# Patient Record
Sex: MALE | Race: WHITE | NOT HISPANIC OR LATINO | Employment: FULL TIME | URBAN - METROPOLITAN AREA
[De-identification: names, ages, dates, MRNs, and addresses within clinical notes are randomized per-mention and may not be internally consistent; named-entity substitution may affect disease eponyms.]

---
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
## 2021-09-18 ENCOUNTER — IMMUNIZATION (OUTPATIENT)
Dept: PRIMARY CARE CLINIC | Facility: CLINIC | Age: 21
End: 2021-09-18

## 2021-09-18 DIAGNOSIS — Z23 NEED FOR VACCINATION: Primary | ICD-10-CM

## 2021-10-04 ENCOUNTER — HOSPITAL ENCOUNTER (EMERGENCY)
Facility: HOSPITAL | Age: 21
Discharge: HOME | End: 2021-10-04
Attending: EMERGENCY MEDICINE
Payer: COMMERCIAL

## 2021-10-04 VITALS
WEIGHT: 175 LBS | HEIGHT: 77 IN | HEART RATE: 75 BPM | DIASTOLIC BLOOD PRESSURE: 63 MMHG | BODY MASS INDEX: 20.66 KG/M2 | RESPIRATION RATE: 18 BRPM | SYSTOLIC BLOOD PRESSURE: 114 MMHG | TEMPERATURE: 99 F | OXYGEN SATURATION: 97 %

## 2021-10-04 DIAGNOSIS — R55 VASOVAGAL ATTACK: Primary | ICD-10-CM

## 2021-10-04 DIAGNOSIS — R10.84 COLICKY ABDOMINAL PAIN: ICD-10-CM

## 2021-10-04 PROCEDURE — 99282 EMERGENCY DEPT VISIT SF MDM: CPT

## 2021-10-04 RX ORDER — DICYCLOMINE HYDROCHLORIDE 10 MG/1
10 CAPSULE ORAL
COMMUNITY
End: 2022-06-02

## 2021-10-04 RX ORDER — SUCRALFATE 1 G/1
1 TABLET ORAL 4 TIMES DAILY
COMMUNITY
End: 2023-01-30

## 2021-10-04 RX ORDER — PANTOPRAZOLE SODIUM 40 MG/1
40 TABLET, DELAYED RELEASE ORAL DAILY
COMMUNITY
End: 2022-03-29

## 2022-02-09 ENCOUNTER — TELEPHONE (OUTPATIENT)
Dept: GASTROENTEROLOGY | Facility: CLINIC | Age: 22
End: 2022-02-09
Payer: COMMERCIAL

## 2022-02-09 NOTE — TELEPHONE ENCOUNTER
Contact and spoke with patient. Reviewed pink sheet with patient. Inform patient to fax any records that he may have at home to [phone].     Patient verbalized understanding

## 2022-02-14 ENCOUNTER — HOSPITAL ENCOUNTER (OUTPATIENT)
Dept: RADIOLOGY | Facility: HOSPITAL | Age: 22
Discharge: HOME | End: 2022-02-14
Attending: NURSE PRACTITIONER
Payer: COMMERCIAL

## 2022-02-14 DIAGNOSIS — R13.10 DYSPHAGIA: ICD-10-CM

## 2022-02-14 DIAGNOSIS — K21.9 GERD (GASTROESOPHAGEAL REFLUX DISEASE): ICD-10-CM

## 2022-02-14 PROCEDURE — 25500020 PHARM REV CODE 255

## 2022-02-14 PROCEDURE — 74220 FL ESOPHAGRAM WITH BARIUM TABLET: ICD-10-PCS | Mod: 26,,, | Performed by: RADIOLOGY

## 2022-02-14 PROCEDURE — 74220 X-RAY XM ESOPHAGUS 1CNTRST: CPT | Mod: 26,,, | Performed by: RADIOLOGY

## 2022-02-14 PROCEDURE — 74220 X-RAY XM ESOPHAGUS 1CNTRST: CPT | Mod: TC

## 2022-02-14 PROCEDURE — A9698 NON-RAD CONTRAST MATERIALNOC: HCPCS

## 2022-02-14 RX ADMIN — BARIUM SULFATE 250 ML: 0.6 SUSPENSION ORAL at 10:02

## 2022-03-04 ENCOUNTER — TELEPHONE (OUTPATIENT)
Dept: GASTROENTEROLOGY | Facility: CLINIC | Age: 22
End: 2022-03-04
Payer: COMMERCIAL

## 2022-03-04 NOTE — TELEPHONE ENCOUNTER
Called pt and scheduled NP appointment for 3/29/22 at 10am    Discussed with pt that Dr. Solis is a consultant that does not accept patients as a primary GI provider.  Discussed that she will send them back to their primary GI provider once their symptoms improved or the plan of care is established.     Pt was told the logistics of the appointment (arrival time, length of visit, total time spent at facility).     Pt was also told that Dr. Solis will review their previous workup but may order additional test and perform her own procedures.    KRYSTIAN faxed to [facility] requesting recent esophagram done last month.  Received email confirmation fax was successful at 3:13

## 2022-03-10 ENCOUNTER — TELEPHONE (OUTPATIENT)
Dept: GASTROENTEROLOGY | Facility: CLINIC | Age: 22
End: 2022-03-10
Payer: COMMERCIAL

## 2022-03-10 NOTE — TELEPHONE ENCOUNTER
Called pt, no ans  [facility] re: if patient had any MBSS study, and if so where.  Asked pt to call or message me.'  Ph no provided

## 2022-03-15 ENCOUNTER — TELEPHONE (OUTPATIENT)
Dept: GASTROENTEROLOGY | Facility: CLINIC | Age: 22
End: 2022-03-15
Payer: COMMERCIAL

## 2022-03-28 RX ORDER — CITALOPRAM 10 MG/1
10 TABLET ORAL DAILY
COMMUNITY
Start: 2022-02-12

## 2022-03-28 RX ORDER — HYDROGEN PEROXIDE 3 %
20 SOLUTION, NON-ORAL MISCELLANEOUS 2 TIMES DAILY
COMMUNITY
Start: 2022-03-03 | End: 2023-01-31

## 2022-03-29 ENCOUNTER — OFFICE VISIT (OUTPATIENT)
Dept: GASTROENTEROLOGY | Facility: CLINIC | Age: 22
End: 2022-03-29
Payer: COMMERCIAL

## 2022-03-29 ENCOUNTER — OFFICE VISIT (OUTPATIENT)
Dept: CARDIOLOGY | Facility: CLINIC | Age: 22
End: 2022-03-29
Payer: COMMERCIAL

## 2022-03-29 ENCOUNTER — TELEPHONE (OUTPATIENT)
Dept: GASTROENTEROLOGY | Facility: CLINIC | Age: 22
End: 2022-03-29

## 2022-03-29 ENCOUNTER — TELEPHONE (OUTPATIENT)
Dept: GASTROENTEROLOGY | Facility: CLINIC | Age: 22
End: 2022-03-29
Payer: COMMERCIAL

## 2022-03-29 VITALS
DIASTOLIC BLOOD PRESSURE: 85 MMHG | HEART RATE: 70 BPM | BODY MASS INDEX: 20.66 KG/M2 | OXYGEN SATURATION: 100 % | HEIGHT: 77 IN | WEIGHT: 175 LBS | SYSTOLIC BLOOD PRESSURE: 130 MMHG

## 2022-03-29 VITALS
BODY MASS INDEX: 21.24 KG/M2 | WEIGHT: 179.88 LBS | HEIGHT: 77 IN | SYSTOLIC BLOOD PRESSURE: 130 MMHG | DIASTOLIC BLOOD PRESSURE: 77 MMHG | HEART RATE: 92 BPM

## 2022-03-29 DIAGNOSIS — K22.2 ESOPHAGEAL STRICTURE: ICD-10-CM

## 2022-03-29 DIAGNOSIS — K21.9 GASTROESOPHAGEAL REFLUX DISEASE, UNSPECIFIED WHETHER ESOPHAGITIS PRESENT: ICD-10-CM

## 2022-03-29 DIAGNOSIS — R55 PRE-SYNCOPE: ICD-10-CM

## 2022-03-29 DIAGNOSIS — R07.89 NON-CARDIAC CHEST PAIN: ICD-10-CM

## 2022-03-29 DIAGNOSIS — F41.9 ANXIETY: ICD-10-CM

## 2022-03-29 DIAGNOSIS — R55 VASOVAGAL NEAR SYNCOPE: ICD-10-CM

## 2022-03-29 DIAGNOSIS — R13.10 DYSPHAGIA, UNSPECIFIED TYPE: Primary | ICD-10-CM

## 2022-03-29 PROCEDURE — 3079F DIAST BP 80-89 MM HG: CPT | Mod: CPTII,S$GLB,, | Performed by: INTERNAL MEDICINE

## 2022-03-29 PROCEDURE — 93005 EKG 12-LEAD: ICD-10-PCS | Mod: S$GLB,,, | Performed by: STUDENT IN AN ORGANIZED HEALTH CARE EDUCATION/TRAINING PROGRAM

## 2022-03-29 PROCEDURE — 1160F PR REVIEW ALL MEDS BY PRESCRIBER/CLIN PHARMACIST DOCUMENTED: ICD-10-PCS | Mod: CPTII,S$GLB,, | Performed by: INTERNAL MEDICINE

## 2022-03-29 PROCEDURE — 93000 ELECTROCARDIOGRAM COMPLETE: CPT | Mod: S$GLB,,, | Performed by: INTERNAL MEDICINE

## 2022-03-29 PROCEDURE — 3075F SYST BP GE 130 - 139MM HG: CPT | Mod: CPTII,S$GLB,, | Performed by: INTERNAL MEDICINE

## 2022-03-29 PROCEDURE — 93005 ELECTROCARDIOGRAM TRACING: CPT | Mod: S$GLB,,, | Performed by: STUDENT IN AN ORGANIZED HEALTH CARE EDUCATION/TRAINING PROGRAM

## 2022-03-29 PROCEDURE — 3008F BODY MASS INDEX DOCD: CPT | Mod: CPTII,S$GLB,, | Performed by: INTERNAL MEDICINE

## 2022-03-29 PROCEDURE — 1160F RVW MEDS BY RX/DR IN RCRD: CPT | Mod: CPTII,S$GLB,, | Performed by: INTERNAL MEDICINE

## 2022-03-29 PROCEDURE — 99999 PR PBB SHADOW E&M-EST. PATIENT-LVL IV: CPT | Mod: PBBFAC,,, | Performed by: STUDENT IN AN ORGANIZED HEALTH CARE EDUCATION/TRAINING PROGRAM

## 2022-03-29 PROCEDURE — 99999 PR PBB SHADOW E&M-EST. PATIENT-LVL V: CPT | Mod: PBBFAC,,, | Performed by: INTERNAL MEDICINE

## 2022-03-29 PROCEDURE — 99205 OFFICE O/P NEW HI 60 MIN: CPT | Mod: S$GLB,,, | Performed by: INTERNAL MEDICINE

## 2022-03-29 PROCEDURE — 99205 PR OFFICE/OUTPT VISIT, NEW, LEVL V, 60-74 MIN: ICD-10-PCS | Mod: S$GLB,,, | Performed by: INTERNAL MEDICINE

## 2022-03-29 PROCEDURE — 1159F PR MEDICATION LIST DOCUMENTED IN MEDICAL RECORD: ICD-10-PCS | Mod: CPTII,S$GLB,, | Performed by: INTERNAL MEDICINE

## 2022-03-29 PROCEDURE — 99999 PR PBB SHADOW E&M-EST. PATIENT-LVL IV: ICD-10-PCS | Mod: PBBFAC,,, | Performed by: STUDENT IN AN ORGANIZED HEALTH CARE EDUCATION/TRAINING PROGRAM

## 2022-03-29 PROCEDURE — 99999 PR PBB SHADOW E&M-EST. PATIENT-LVL V: ICD-10-PCS | Mod: PBBFAC,,, | Performed by: INTERNAL MEDICINE

## 2022-03-29 PROCEDURE — 99204 PR OFFICE/OUTPT VISIT, NEW, LEVL IV, 45-59 MIN: ICD-10-PCS | Mod: 25,S$GLB,, | Performed by: STUDENT IN AN ORGANIZED HEALTH CARE EDUCATION/TRAINING PROGRAM

## 2022-03-29 PROCEDURE — 93000 EKG 12-LEAD: ICD-10-PCS | Mod: S$GLB,,, | Performed by: INTERNAL MEDICINE

## 2022-03-29 PROCEDURE — 99204 OFFICE O/P NEW MOD 45 MIN: CPT | Mod: 25,S$GLB,, | Performed by: STUDENT IN AN ORGANIZED HEALTH CARE EDUCATION/TRAINING PROGRAM

## 2022-03-29 PROCEDURE — 3008F PR BODY MASS INDEX (BMI) DOCUMENTED: ICD-10-PCS | Mod: CPTII,S$GLB,, | Performed by: INTERNAL MEDICINE

## 2022-03-29 PROCEDURE — 3079F PR MOST RECENT DIASTOLIC BLOOD PRESSURE 80-89 MM HG: ICD-10-PCS | Mod: CPTII,S$GLB,, | Performed by: INTERNAL MEDICINE

## 2022-03-29 PROCEDURE — 1159F MED LIST DOCD IN RCRD: CPT | Mod: CPTII,S$GLB,, | Performed by: INTERNAL MEDICINE

## 2022-03-29 PROCEDURE — 3075F PR MOST RECENT SYSTOLIC BLOOD PRESS GE 130-139MM HG: ICD-10-PCS | Mod: CPTII,S$GLB,, | Performed by: INTERNAL MEDICINE

## 2022-03-29 NOTE — TELEPHONE ENCOUNTER
AVS reviewed with patient and mother. Copy given  Scheduled cardiology apt for today at 2:20pm    Asked pt to call Kacie when he gets his work schedule on or about 4/7/22 and leave her a message as to days off of work. 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

## 2022-03-29 NOTE — ASSESSMENT & PLAN NOTE
-suspect this is 2/2 from anxiety/autonomic dysfunction  -ECG in clinic with normal sinus rhythm  -pending TTE  -if TTE unrevealing and symptoms persist, will refer to Dr. Marcelino in  for autonomic dysfunction

## 2022-03-29 NOTE — TELEPHONE ENCOUNTER
[facility] PROCEDURE ORDERS    CLEARANCE FOR PROCEDURES:  [] Not needed   [x] Cardiology  For EGD not manometry   [] Pulmonary  [] PCP    PROCEDURES  [] EGD   [] Colonoscopy   [x] EGD with EndoFlip   [x] Esophageal manometry with impedance - dysphagia   [x] Esophageal manometry with impedance - rumination  [] Esophageal manometry with impedance - belching   [] EGD with BRAVO 48 hours   [] EGD with BRAVO 96 hours   [] pH Impedance 24 hours   [] Anorectal manometry   [] Rectal Ultrasound     Does manometry need to be placed endoscopically:   [] Yes    FLOOR:  [x] 4th Floor   [] 2nd Floor    Reason for 2nd Floor:   [] Gastroparesis   [] End stage achalasia  [] Pre lung transplant   [] Pre hear transplant   [] Pulmonary HTN (PAP>50 or on meds)   [] Severe pulmonary Disease   [] Complex medical problems   [] BMI>50  [] History of anesthesia issues  [] Other:    PREP  [x] Standard Prep  [] Severe Constipation Prep (Low residue diet 7 days.  1.5 prep the day prior, 0.5 prep the day of procedure)  [] Full liquid diet 5 days   [] Full liquid diet 3 days   [] Full liquid diet 2 days   [] Full liquid diet 1 day   [] Other:     MEDICATIONS    pH Studies  [] ON PPI/H2 Blocker   [] OFF PPI/H2 Blocker     Metal allergy:   []  Yes    Pacemaker/defibrillator:  [] Yes    Motility Studies (esophageal manometry/anorectal manometry)  Hold narcotics x 1 days if able   Hold TCA x 1 days if able  Propofol/lidocaine only during sedation.  Discuss with Dr. Solis if additional sedation needed.   Hold baclofen for thee days (at least one day) if able   Hold muscle relaxants x 1 day if able     Anticoagulation/antiplt agents:   []  Yes    ORDER OF TESTING:  Day 1: Manometry ASAP  Day 2: EGD/Flip pending cardiac clearance     [] No special requirements - pt lives locally     SCHEDULING PRIORITY  [] Urgent  (<7 days)  [] Priority (<30 days)  [x] Routine 1 (schedule ASAP)  [] Routine 2 (next available, < 3 months)   [] Routine 3 (ok if > 3  months)       PHYSICIAN  []  Ok with Dr. Gutierrez   []  Ok with any GI MD

## 2022-03-29 NOTE — PROGRESS NOTES
" Patient ID:  Sincere Way is a 21 y.o. y.o. male who presents for evaluation Establish Care and Follow-up      Sincere Way is a 20 yo M with anxiety and GERD/esophageal stricture who presents for pre-syncope episodes. He states these episodes have happened several times, and seem to be related to worsening GERD symptoms or when he is at work. He denies preceding palpitations, and states that he begins to feel hot, sweaty and has to lie down. This has happened at work and his coworkers have taken his BP which he reports as SBP in the 80s. If he lies down, and relaxes, he states his symptoms improve. He has no significant prior history of CHF/arrhythmia and states no one in his family has suffered from connective tissue disorders or similar complaints. He recently saw his GI doctor who believes his symptoms sounded cardiac in nature, and he was then referred to [facility] cardiology for evaluation. No other acute events.       Review of Systems   Cardiovascular: Negative for chest pain and palpitations.   Neurological: Positive for dizziness and weakness.   All other systems reviewed and are negative.       Objective:     /77 (BP Location: Left arm, Patient Position: Sitting, BP Method: Medium (Automatic))   Pulse 92   Ht 6' 5" (1.956 m)   Wt 81.6 kg (179 lb 14.3 oz)   BMI 21.33 kg/m²     Physical Exam  Vitals and nursing note reviewed.   Constitutional:       Appearance: Normal appearance.   HENT:      Head: Normocephalic and atraumatic.      Right Ear: External ear normal.      Left Ear: External ear normal.      Nose: Nose normal.      Mouth/Throat:      Mouth: Mucous membranes are moist.   Eyes:      Pupils: Pupils are equal, round, and reactive to light.   Cardiovascular:      Rate and Rhythm: Normal rate and regular rhythm.      Pulses: Normal pulses.   Pulmonary:      Effort: Pulmonary effort is normal.   Abdominal:      General: Abdomen is flat.   Musculoskeletal:         General: Normal range of " motion.      Cervical back: Normal range of motion.   Skin:     General: Skin is warm.      Capillary Refill: Capillary refill takes less than 2 seconds.   Neurological:      General: No focal deficit present.      Mental Status: He is alert and oriented to person, place, and time.         Labs:     Lab Results   Component Value Date     02/26/2014    K 4.4 02/26/2014    CL 99 02/26/2014    CO2 27 02/26/2014    BUN 12 02/26/2014    CREATININE 0.9 02/26/2014    ANIONGAP 12 02/26/2014     No results found for: HGBA1C  No results found for: BNP, BNPTRIAGEBLO    Lab Results   Component Value Date    WBC 5.11 02/26/2014    HGB 14.7 02/26/2014    HCT 43.0 02/26/2014     (L) 02/26/2014    GRAN 3.4 02/26/2014    GRAN 66.2 (H) 02/26/2014     No results found for: CHOL, HDL, LDLCALC, TRIG    Meds:     Current Outpatient Medications:     citalopram (CELEXA) 10 MG tablet, Take 10 mg by mouth once daily., Disp: , Rfl:     dicyclomine (BENTYL) 10 MG capsule, Take 10 mg by mouth 4 (four) times daily before meals and nightly., Disp: , Rfl:     esomeprazole (NEXIUM) 20 MG capsule, Take 20 mg by mouth 2 (two) times daily., Disp: , Rfl:     sucralfate (CARAFATE) 1 gram tablet, Take 1 g by mouth 4 (four) times daily., Disp: , Rfl:       Assessment & Plan:     Vasovagal near syncope  -suspect this is 2/2 from anxiety/autonomic dysfunction  -ECG in clinic with normal sinus rhythm  -pending TTE  -if TTE unrevealing and symptoms persist, will refer to Dr. Marcelino in  for autonomic dysfunction     Esophageal stricture  -pending GI workup

## 2022-03-29 NOTE — PATIENT INSTRUCTIONS
-See cardiology to evaluate episodes of dizziness and low blood pressure and to provide cardiac clearance   -Start peppermint three to four times per day (altoids, peppermint tea, peppermint oil (four drops in half glass of water)   -Complete esophageal manometry   During esophageal manometry, a thin, flexible tube (catheter) that contains pressure sensors is passed through your nose, down your esophagus and into your stomach. Esophageal manometry can be helpful in diagnosing certain disorders that can affect your esophagus.  Esophageal manometry provides information about the movement of food through the esophagus into the stomach. The test measures how well the muscles at the top and bottom of your esophagus (sphincter muscles) open and close, as well as the pressure, speed and pattern of the wave of esophageal muscle contractions that moves food along.  -Complete EGD with EndoFlip  EGD is an endoscopic procedure that allows your doctor to examine your esophagus, stomach and duodenum (part of your small intestine). EGD is an outpatient procedure, meaning you can go home that same day. It takes approximately 30 to 60 minutes to perform.    EndoFLIP is a technology that simultaneously measures the area across the inside of a gastrointestinal organ (for example, the esophagus) and the pressure inside that organ. The ratio of the two measurements is called distensibility (stiffness).  How is EndoFLIP performed?  During upper endoscopy, while you are sedated, your doctor will place a catheter through your mouth into your esophagus. Fluid is passed through the catheter to inflate a cylinder-shaped balloon that contains specialized sensors. From the patients perspective, undergoing a diagnostic EndoFLIP is exactly similar to the experience of undergoing upper endoscopy. The procedure can add 10 to 15 minutes to the total duration of the endoscopy.  Who needs EndoFLIP?  EndoFLIP can help find the cause of difficulty  swallowing; for example, in patients with suspected achalasia, or in patients who have difficulty swallowing after surgery for gastroesophageal reflux disease (GERD). EndoFLIP can also be used to perform a therapeutic dilation, without the need for X-rays.  What are the potential complications of EndoFLIP?  When EndoFLIP is used for diagnostic purposes, in theory, there is a risk of bleeding or perforation (putting a hole in the wall of the gut), but the catheter is connected to a computer that drives the inflation and deflation of the balloon, thus limiting the size and pressure of the inflation. There are no known reports of serious complications when EndoFLIP has been used for diagnostic purposes.

## 2022-03-29 NOTE — PROGRESS NOTES
[facility] Consultation Note    Reason for Consult:    Chief Complaint   Patient presents with    Heartburn    Dysphagia         PCP:   Amanda Richardson   [address] / [facility] [id number]    Referring MD:  Cachorro Swenson Md  [address]  [address]  [place] [address]      HPI:  Sincere Way is a 21 y.o. male referred to motility clinic for second opinion regarding the following problems:        Dr. Alford 01/12/2020: 21-year-old man here with complaints of dysphagia to solids more so than liquids.  Upper GI was normal and EGD done 09/2021 demonstrated a lower esophageal stricture which was dilated with a balloon to 54 Bengali.  This did nothing for his symptoms.  He continues to have bolus impactions and frequently has to vomit his food up.  He also has reflux only partially relieved by Nexium 20 mg q.h.s..  He was having abdominal pain which has been relieved by citalopram.  Plan consult [facility] for esophageal motility study.  Increase Nexium to 20 mg b.i.d..    GERD.      Retrosternal pyrosis: occasional    Regurgitation: none  Onset: after COVID 2020  Improve with:   PPI     Upright symptoms: yes  Nocturnal symptoms: no, yes in the past     Caffeine intake: few cups per day     Sleeps with head of the bed elevated. w pillows/on L side  Avoids eating prior to bedtime.  yes    MEDs:  Nexium 20mg daily   Tums prn     Regurgitates white foamy liquids: daily     Rumination Syndrome  Symptoms occur within 300 minutes of finishing a meal: yes  Regurgitate lacks sour or bitter taste: yes  Regurgitate described as tasting similar to the food recently ingested: yes  Little or no improvement after GERD or nausea directed treatment: yes  No symptoms during sleep or fasting: yes  Weight loss (40% of patients): yes  Related to reflux: yes  Related to belching: no      Dysphagia.    Problems with solids: w every meal   Problems with liquids: occasional   Choking  while eating: occasional    Coughing while eating: occasional  Location: epigastric   Onset of symptoms: 2020, progressively worse   Worse w cold     History of food impactions requiring ED visit: no  History of allergies/eczema. no  Short duration of dysphagia (<1 year): no  Serious weight loss (>6.8 kg): yes  Age over 55 years: no    No meds  EGD w dilation 54Fr without improvement1          Chest pain with and without eating.   Burning.  Associated w sharp pain in neck pain   Onset: 2020   Triggers (cold fluids, caffeine, smoking, ETOH): none    Eckardt Symptom Score  Dysphagia: 3  Regurgitation: 2  Retrosternal pain: 1  Weight Loss: 0  Total: 6    Abd pain.  IBS-D  Improved w celexa     Weight loss   15 lb initially, than gained some  back  Now 180lb    Vasovagal response triggered by GERD episodes, sometimes triggered by other symptoms including stress  BP 80/50  4 episodes recently   Had mild version of these symptoms as a child   Started 2021  Seen in ED, told that had a vagal response      Anxiety (abd pain w stress)  Not aware of anxiety   On celexa per PCP   Never seen a counselor     Insomnia. Improved w PPI     Denies nausea, early satiety, abdominal pain, bloating, diarrhea, constipation, BRBPR, melena      Total visit time was 62  minutes, more than 50% of which was spent in face-to-face counseling with patient regarding symptoms, diagnostic results, prognosis, risks and benefits of treatment options, instructions for management, importance of compliance with chosen treatment options and coping strategies.      Previous Studies:   Esophagram 02/14/2022:  Significant narrowing of distal esophagus at the level of the GE junction with lack of passage of barium tablet concerning for stricture.  Abnormal thickening of the walls of the stomach with similar appearance of thickening of the esophagus concerning for gastrin esophagitis.  Questionable ulceration middle to distal esophagus.  Initial images  suggestive of possible retention of fluid within the mid and distal esophagus  EGD 09/21/2021:  Normal stomach normal duodenum normal upper 3rd of esophagus (-).  Stenosis at GE junction.  Dilation 54 Chilean.  HIDA 08/24/2021:  There is no evidence of acute cholecystitis.  Normal gallbladder ejection fraction.  Ultrasound gallbladder 7/28/2021:2 small gallbladder wall polyps measuring 5 mm and 6 mm.   CT abdomen 06/17/2021:  There is a small cyst of the anterior left liver lobe and there is a punctate cyst of the inferior right liver lobe.  No abnormality of gallbladder, pancreas or spleen.  No evidence of acute inflammation in the abdomen.  No free fluid or free air.  No abnormality of the adrenals or kidneys.  Clips in the right lower quadrant from the appendectomy.    Upper GI series 05/31/2021:  Unremarkable.  Esophagus appears normal in caliber and course and motility.  There is no reflux.  No hiatal hernia.  Normal stomach normal duodenum normal jejunum.      Relevant Surgical History:    None     ROS:  ROS   Constitutional: No fevers, no chills, no night sweats, no weight loss  ENT: No congestion, no rhinorrhea, no chronic sinus problems  CV: No chest pain, no palpitations  Pulm: No cough, no shortness of breath  Ophtho: No blurry vision, no eye redness  GI: see HPI  Derm: No rash  Heme: No lymphadenopathy, no bruising  MSK: No arthritis, no joint swelling, no Raynauds  : No dysuria, no frequent urination, no blood in urine  Endo: No hot or cold intolerance  Neuro: + dizziness, no syncope, no seizure  Psych: No anxiety, no depression  Complete ROS negative except as above    Medical History:   Past Medical History:   Diagnosis Date    Anxiety disorder, unspecified     COVID-19 11/2020    GERD (gastroesophageal reflux disease)         Surgical History:   Past Surgical History:   Procedure Laterality Date    APPENDECTOMY      WISDOM TOOTH EXTRACTION          Family History:   Family History   Problem  "Relation Age of Onset    Hypertension Mother     Diabetes Father     Hyperlipidemia Father     Pancreatic cancer Paternal Uncle     Pancreatitis Maternal Grandmother     Hiatal hernia Maternal Grandmother     Lung cancer Maternal Grandfather     Breast cancer Paternal Grandmother     Colon polyps Paternal Grandfather     Diabetes Paternal Grandfather     Parkinsonism Paternal Grandfather     Celiac disease Neg Hx     Colon cancer Neg Hx     Crohn's disease Neg Hx     Esophageal cancer Neg Hx     Inflammatory bowel disease Neg Hx     Irritable bowel syndrome Neg Hx     Liver cancer Neg Hx     Rectal cancer Neg Hx     Stomach cancer Neg Hx     Ulcerative colitis Neg Hx         Social History:   Social History     Socioeconomic History    Marital status: Single   Tobacco Use    Smoking status: Never Smoker    Smokeless tobacco: Never Used   Substance and Sexual Activity    Alcohol use: No    Drug use: No        Review of patient's allergies indicates:  No Known Allergies    Current Outpatient Medications   Medication Sig Dispense Refill    citalopram (CELEXA) 10 MG tablet Take 10 mg by mouth once daily.      esomeprazole (NEXIUM) 20 MG capsule Take 20 mg by mouth 2 (two) times daily.      dicyclomine (BENTYL) 10 MG capsule Take 10 mg by mouth 4 (four) times daily before meals and nightly.      pantoprazole (PROTONIX) 40 MG tablet Take 40 mg by mouth once daily.      sucralfate (CARAFATE) 1 gram tablet Take 1 g by mouth 4 (four) times daily.       No current facility-administered medications for this visit.        Objective Findings:  Vital Signs:  /85   Pulse 70   Ht 6' 5" (1.956 m)   Wt 79.4 kg (175 lb)   SpO2 100%   BMI 20.75 kg/m²   Body mass index is 20.75 kg/m².    Physical Exam:  General appearance: alert, cooperative, no distress  HENT: Normocephalic, atraumatic, neck symmetrical, no nasal discharge  Eyes: conjunctivae/corneas clear, PERRL, EOM's intact  Lungs: clear to " auscultation bilaterally, no dullness to percussion bilaterally  Heart: regular rate and rhythm without rub; no displacement of the PMI  Abdomen: soft, non-tender; bowel sounds normoactive; no organomegaly  Extremities: extremities symmetric; no clubbing, cyanosis, or edema  Integument: Skin color, texture, turgor normal; no rashes; hair distrubution normal  Neurologic: Alert and oriented X 3, normal strength, normal coordination and gait  Psychiatric: no pressured speech; normal affect; no evidence of impaired cognition    Labs:   Reviewed in Epic/record      Assessment and Plan:  Sincere Way is a 21 y.o. male with referred to \Esophageal Motility Clinic for 2nd opinion regarding following problems:    GERD.    -On nexium 20mg daily   -Tums prn    Dysphagia to solids and liquids.    Regurgitation of white foamy liquid  Chest pain/pyrosis  Eckard 6/12  Esophagogram w stricture at GEJ, tablet did not pass.   EGD w dilation 54Fr without improvement1   -manometry r/o dysphagia, r/o rumination   -EGD EoE, w Endoflip +/- dilation     Abnormal thickening of the walls of the stomach and esophagus  -EGD    Rumination Syndrome Ros positive   -manometry r/o dysphagia, r/o rumination     Abd pain.  IBS-D  Resolved w celexa   -Def to primary GI     Weight loss  15 lb initially.  Now  Stable     Gb polyps   -Def to primary GI     Pre syncope  BP 80/50  4 episodes recently   Had mild version of these symptoms as a child   -Ref to cardiology to r/o autonomic dysfunction, provide cardiac clearance     Anxiety. Not aware of feeling anxious. Sx: abd pain w stress  On celexa per PCP   Never seen a counselor     Follow up in about 3 months (around 6/29/2022).    1. Dysphagia, unspecified type    2. Pre-syncope    3. Gastroesophageal reflux disease, unspecified whether esophagitis present    4. Non-cardiac chest pain          Order summary:  Orders Placed This Encounter    Ambulatory referral/consult to Cardiology    Case Request  Endoscopy: ESOPHAGOGASTRODUODENOSCOPY (EGD)    Case Request Endoscopy: MANOMETRY-ESOPHAGEAL-WITH IMPEDANCE       Discussed with PT that I act as a consult service and do not accept patients to be their primary GI provider. Discussed that the goal of our visits is to address relevant motility problems while deferring other GI problems as well as screening and surveillance to his/her primary GI provider.   Discussed that he/she needs to continue to follow with his local primary GI provider.  Discussed that we will complete his/her workup, clarify diagnosis and attempt to optimize his/her symptoms with intention of him/her returning to referring GI provider for long term GI care.   Pt verbalized understanding.        Thank you so much for allowing me to participate in the care of Sinecre Solis MD      This note was created using voice recognition software, and may contain some unrecognized transcriptional errors.

## 2022-04-11 ENCOUNTER — HOSPITAL ENCOUNTER (OUTPATIENT)
Dept: CARDIOLOGY | Facility: HOSPITAL | Age: 22
Discharge: HOME | End: 2022-04-11
Attending: STUDENT IN AN ORGANIZED HEALTH CARE EDUCATION/TRAINING PROGRAM
Payer: COMMERCIAL

## 2022-04-11 VITALS
HEART RATE: 80 BPM | BODY MASS INDEX: 21.13 KG/M2 | SYSTOLIC BLOOD PRESSURE: 118 MMHG | HEIGHT: 77 IN | WEIGHT: 179 LBS | DIASTOLIC BLOOD PRESSURE: 70 MMHG

## 2022-04-11 DIAGNOSIS — R55 PRE-SYNCOPE: ICD-10-CM

## 2022-04-11 LAB
ASCENDING AORTA: 2.86 CM
AV INDEX (PROSTH): 0.86
AV MEAN GRADIENT: 4 MMHG
AV PEAK GRADIENT: 6 MMHG
AV VALVE AREA: 3.82 CM2
AV VELOCITY RATIO: 0.87
BSA FOR ECHO PROCEDURE: 2.1 M2
CV ECHO LV RWT: 0.31 CM
DOP CALC AO PEAK VEL: 1.24 M/S
DOP CALC AO VTI: 27.03 CM
DOP CALC LVOT AREA: 4.4 CM2
DOP CALC LVOT DIAMETER: 2.38 CM
DOP CALC LVOT PEAK VEL: 1.08 M/S
DOP CALC LVOT STROKE VOLUME: 103.29 CM3
DOP CALCLVOT PEAK VEL VTI: 23.23 CM
E WAVE DECELERATION TIME: 164.29 MSEC
E/A RATIO: 1.72
E/E' RATIO: 5.76 M/S
ECHO LV POSTERIOR WALL: 0.76 CM (ref 0.6–1.1)
EJECTION FRACTION: 65 %
FRACTIONAL SHORTENING: 38 % (ref 28–44)
INTERVENTRICULAR SEPTUM: 0.77 CM (ref 0.6–1.1)
LA MAJOR: 4.41 CM
LA MINOR: 4.5 CM
LA WIDTH: 3.23 CM
LEFT ATRIUM SIZE: 2.78 CM
LEFT ATRIUM VOLUME INDEX MOD: 18.9 ML/M2
LEFT ATRIUM VOLUME INDEX: 16 ML/M2
LEFT ATRIUM VOLUME MOD: 40.36 CM3
LEFT ATRIUM VOLUME: 34 CM3
LEFT INTERNAL DIMENSION IN SYSTOLE: 3.03 CM (ref 2.1–4)
LEFT VENTRICLE DIASTOLIC VOLUME INDEX: 52.6 ML/M2
LEFT VENTRICLE DIASTOLIC VOLUME: 112.04 ML
LEFT VENTRICLE MASS INDEX: 58 G/M2
LEFT VENTRICLE SYSTOLIC VOLUME INDEX: 16.9 ML/M2
LEFT VENTRICLE SYSTOLIC VOLUME: 35.93 ML
LEFT VENTRICULAR INTERNAL DIMENSION IN DIASTOLE: 4.89 CM (ref 3.5–6)
LEFT VENTRICULAR MASS: 123.47 G
LV LATERAL E/E' RATIO: 5.76 M/S
LV SEPTAL E/E' RATIO: 5.76 M/S
MV A" WAVE DURATION": 13.42 MSEC
MV PEAK A VEL: 0.57 M/S
MV PEAK E VEL: 0.98 M/S
MV STENOSIS PRESSURE HALF TIME: 47.64 MS
MV VALVE AREA P 1/2 METHOD: 4.62 CM2
PISA TR MAX VEL: 1.64 M/S
PULM VEIN S/D RATIO: 0.92
PV PEAK D VEL: 0.49 M/S
PV PEAK S VEL: 0.45 M/S
RA MAJOR: 4.2 CM
RA PRESSURE: 3 MMHG
RA WIDTH: 3.56 CM
RIGHT VENTRICULAR END-DIASTOLIC DIMENSION: 3.17 CM
RV TISSUE DOPPLER FREE WALL SYSTOLIC VELOCITY 1 (APICAL 4 CHAMBER VIEW): 16.23 CM/S
SINUS: 2.91 CM
STJ: 2.67 CM
TDI LATERAL: 0.17 M/S
TDI SEPTAL: 0.17 M/S
TDI: 0.17 M/S
TR MAX PG: 11 MMHG
TRICUSPID ANNULAR PLANE SYSTOLIC EXCURSION: 2.56 CM
TV REST PULMONARY ARTERY PRESSURE: 14 MMHG

## 2022-04-11 PROCEDURE — 93306 TTE W/DOPPLER COMPLETE: CPT

## 2022-04-11 PROCEDURE — 93306 ECHO (CUPID ONLY): ICD-10-PCS | Mod: 26,,, | Performed by: INTERNAL MEDICINE

## 2022-04-11 PROCEDURE — 93306 TTE W/DOPPLER COMPLETE: CPT | Mod: 26,,, | Performed by: INTERNAL MEDICINE

## 2022-04-13 ENCOUNTER — DOCUMENTATION ONLY (OUTPATIENT)
Dept: CARDIOLOGY | Facility: HOSPITAL | Age: 22
End: 2022-04-13
Payer: COMMERCIAL

## 2022-04-26 ENCOUNTER — TELEPHONE (OUTPATIENT)
Dept: ENDOSCOPY | Facility: HOSPITAL | Age: 22
End: 2022-04-26
Payer: COMMERCIAL

## 2022-04-26 NOTE — TELEPHONE ENCOUNTER
Contacted patient to schedule procedure. As per our conversation,  patient is scheduled for Esophageal Manometry on 4/29/22 at 8:00 am. Instructions reviewed with patient and informed instructions will be on patient portal and emailed to [email or web address] for review.

## 2022-04-29 ENCOUNTER — HOSPITAL ENCOUNTER (OUTPATIENT)
Facility: HOSPITAL | Age: 22
Discharge: HOME | End: 2022-04-29
Attending: INTERNAL MEDICINE | Admitting: INTERNAL MEDICINE
Payer: COMMERCIAL

## 2022-04-29 VITALS
HEIGHT: 77 IN | DIASTOLIC BLOOD PRESSURE: 84 MMHG | SYSTOLIC BLOOD PRESSURE: 131 MMHG | WEIGHT: 178 LBS | HEART RATE: 78 BPM | OXYGEN SATURATION: 100 % | BODY MASS INDEX: 21.02 KG/M2 | RESPIRATION RATE: 18 BRPM | TEMPERATURE: 98 F

## 2022-04-29 DIAGNOSIS — R13.10 DYSPHAGIA: ICD-10-CM

## 2022-04-29 PROCEDURE — 91037 ESOPH IMPED FUNCTION TEST: CPT | Mod: TC | Performed by: INTERNAL MEDICINE

## 2022-04-29 PROCEDURE — 91010 ESOPHAGUS MOTILITY STUDY: CPT | Mod: TC | Performed by: INTERNAL MEDICINE

## 2022-04-29 PROCEDURE — 25000003 PHARM REV CODE 250: Performed by: INTERNAL MEDICINE

## 2022-04-29 RX ORDER — LIDOCAINE HYDROCHLORIDE 20 MG/ML
JELLY TOPICAL ONCE
Status: COMPLETED | OUTPATIENT
Start: 2022-04-29 | End: 2022-04-29

## 2022-04-29 RX ADMIN — LIDOCAINE HYDROCHLORIDE 10 ML: 20 JELLY TOPICAL at 07:04

## 2022-05-03 ENCOUNTER — TELEPHONE (OUTPATIENT)
Dept: GASTROENTEROLOGY | Facility: CLINIC | Age: 22
End: 2022-05-03
Payer: COMMERCIAL

## 2022-05-03 NOTE — TELEPHONE ENCOUNTER
Called pt's home, spoke with pt's wife.  Explained cardiologist has given clearance, will call Kacie to discuss scheduling EGD.  Direct ph no given.

## 2022-05-04 ENCOUNTER — TELEPHONE (OUTPATIENT)
Dept: ENDOSCOPY | Facility: HOSPITAL | Age: 22
End: 2022-05-04
Payer: COMMERCIAL

## 2022-05-05 ENCOUNTER — TELEPHONE (OUTPATIENT)
Dept: GASTROENTEROLOGY | Facility: CLINIC | Age: 22
End: 2022-05-05
Payer: COMMERCIAL

## 2022-05-05 PROCEDURE — 91010 PR ESOPHAGEAL MOTILITY STUDY, MA2METRY: ICD-10-PCS | Mod: 26,,, | Performed by: INTERNAL MEDICINE

## 2022-05-05 PROCEDURE — 91037 PR GERD TST W/ NASAL IMPEDENCE ELECTROD: ICD-10-PCS | Mod: 26,,, | Performed by: INTERNAL MEDICINE

## 2022-05-05 PROCEDURE — 91010 ESOPHAGUS MOTILITY STUDY: CPT | Mod: 26,,, | Performed by: INTERNAL MEDICINE

## 2022-05-05 PROCEDURE — 91037 ESOPH IMPED FUNCTION TEST: CPT | Mod: 26,,, | Performed by: INTERNAL MEDICINE

## 2022-05-05 NOTE — PROVATION PATIENT INSTRUCTIONS
Discharge Summary/Instructions after an Endoscopic Procedure  Patient Name: Sincere Way  Patient MRN: [id number]  Patient YOB: 2000  Thursday, May 5, 2022  Winter Solis MD  Dear patient,  As a result of recent federal legislation (The Federal Cures Act), you may   receive lab or pathology results from your procedure in your [facility]   account before your physician is able to contact you. Your physician or   their representative will relay the results to you with their   recommendations at their soonest availability.  Thank you,  RESTRICTIONS:  During your procedure today, you received medications for sedation.  These   medications may affect your judgment, balance and coordination.  Therefore,   for 24 hours, you have the following restrictions:   - DO NOT drive a car, operate machinery, make legal/financial decisions,   sign important papers or drink alcohol.    ACTIVITY:  Today: no heavy lifting, straining or running due to procedural   sedation/anesthesia.  The following day: return to full activity including work.  DIET:  Eat and drink normally unless instructed otherwise.     TREATMENT FOR COMMON SIDE EFFECTS:  - Mild abdominal pain, nausea, belching, bloating or excessive gas:  rest,   eat lightly and use a heating pad.  - Sore Throat: treat with throat lozenges and/or gargle with warm salt   water.  - Because air was used during the procedure, expelling large amounts of air   from your rectum or belching is normal.  - If a bowel prep was taken, you may not have a bowel movement for 1-3 days.    This is normal.  SYMPTOMS TO WATCH FOR AND REPORT TO YOUR PHYSICIAN:  1. Abdominal pain or bloating, other than gas cramps.  2. Chest pain.  3. Back pain.  4. Signs of infection such as: chills or fever occurring within 24 hours   after the procedure.  5. Rectal bleeding, which would show as bright red, maroon, or black stools.   (A tablespoon of blood from the rectum is not serious, especially if    hemorrhoids are present.)  6. Vomiting.  7. Weakness or dizziness.  GO DIRECTLY TO THE NEAREST EMERGENCY ROOM IF YOU HAVE ANY OF THE FOLLOWING:      Difficulty breathing              Chills and/or fever over 101 F   Persistent vomiting and/or vomiting blood   Severe abdominal pain   Severe chest pain   Black, tarry stools   Bleeding- more than one tablespoon   Any other symptom or condition that you feel may need urgent attention  Your doctor recommends these additional instructions:  If any biopsies were taken, your doctors clinic will contact you in 1 to 2   weeks with any results.  - Return to my office.  For questions, problems or results please call your physician - Winter Solis MD at Work:  [phone].  [facility], EMERGENCY ROOM PHONE NUMBER: [phone]  IF A COMPLICATION OR EMERGENCY SITUATION ARISES AND YOU ARE UNABLE TO REACH   YOUR PHYSICIAN - GO DIRECTLY TO THE EMERGENCY ROOM.  Winter Solis MD  5/5/2022 2:42:32 PM  This report has been verified and signed electronically.  Dear patient,  As a result of recent federal legislation (The Federal Cures Act), you may   receive lab or pathology results from your procedure in your [facility]   account before your physician is able to contact you. Your physician or   their representative will relay the results to you with their   recommendations at their soonest availability.  Thank you,  PROVATION

## 2022-05-05 NOTE — TELEPHONE ENCOUNTER
Manometry Results:    Please let patient know that the manometry shows    Achalasia - Type II      Recommendation:  Follow up with Dr. Solis after all studies completed - virtual   If unable to do virtual, than should have apt w surgery on the same day

## 2022-05-09 ENCOUNTER — TELEPHONE (OUTPATIENT)
Dept: GASTROENTEROLOGY | Facility: CLINIC | Age: 22
End: 2022-05-09
Payer: COMMERCIAL

## 2022-05-09 NOTE — TELEPHONE ENCOUNTER
Returned mother's ph call, explained per dr castillo :      Achalasia - Type II       Recommendation:  Follow up with Dr. Castillo after all studies completed - virtual   If unable to do virtual, than should have apt w surgery on the same day     Mother said will probably want to come in to see Dr Castillo and surgeon on the same day.    Mother explained will have pt call Kacie today to discuss scheduling EGD w/ endoflip

## 2022-05-16 ENCOUNTER — TELEPHONE (OUTPATIENT)
Dept: ENDOSCOPY | Facility: HOSPITAL | Age: 22
End: 2022-05-16
Payer: COMMERCIAL

## 2022-05-16 NOTE — TELEPHONE ENCOUNTER
Contacted patient to schedule procedure. As per our conversation,  patient is scheduled for EGD/Endoflip on 5/23/22 at 9:00am. Instructions reviewed with patient and informed instructions will be emailed for review.

## 2022-05-16 NOTE — TELEPHONE ENCOUNTER
May 10, 2022         NM    4:32 PM  Maryann Ramos MA routed this conversation to Me        Hugo Ordaz MD  to Maryann Ramos MA    AD      4:23 PM  Yes, he has no further cardiac workup indicated prior to EGD.     May 4, 2022         NM    4:34 PM  Maryann Ramos MA routed this conversation to Hugo Ordaz MD                 4:20 PM  You routed this conversation to [employer] Staff  Hugo Ordaz MD        [facility]    4:20 PM  Note  Patient needs to schedule EGD. Okay to proceed from cardiac standpoint? Please advise. Thanks.

## 2022-05-23 ENCOUNTER — HOSPITAL ENCOUNTER (OUTPATIENT)
Facility: HOSPITAL | Age: 22
Discharge: HOME | End: 2022-05-23
Attending: INTERNAL MEDICINE | Admitting: INTERNAL MEDICINE
Payer: COMMERCIAL

## 2022-05-23 ENCOUNTER — ANESTHESIA EVENT (OUTPATIENT)
Dept: ENDOSCOPY | Facility: HOSPITAL | Age: 22
End: 2022-05-23
Payer: COMMERCIAL

## 2022-05-23 ENCOUNTER — ANESTHESIA (OUTPATIENT)
Dept: ENDOSCOPY | Facility: HOSPITAL | Age: 22
End: 2022-05-23
Payer: COMMERCIAL

## 2022-05-23 VITALS
BODY MASS INDEX: 20.66 KG/M2 | WEIGHT: 175 LBS | RESPIRATION RATE: 16 BRPM | TEMPERATURE: 99 F | HEART RATE: 93 BPM | DIASTOLIC BLOOD PRESSURE: 59 MMHG | HEIGHT: 77 IN | OXYGEN SATURATION: 99 % | SYSTOLIC BLOOD PRESSURE: 108 MMHG

## 2022-05-23 DIAGNOSIS — R13.10 DYSPHAGIA: ICD-10-CM

## 2022-05-23 DIAGNOSIS — R13.10 DYSPHAGIA, UNSPECIFIED TYPE: Primary | ICD-10-CM

## 2022-05-23 PROCEDURE — 88312 PR  SPECIAL STAINS,GROUP I: ICD-10-PCS | Mod: 26,,, | Performed by: PATHOLOGY

## 2022-05-23 PROCEDURE — 63600175 PHARM REV CODE 636 W HCPCS: Performed by: NURSE ANESTHETIST, CERTIFIED REGISTERED

## 2022-05-23 PROCEDURE — 88305 TISSUE EXAM BY PATHOLOGIST: CPT | Performed by: PATHOLOGY

## 2022-05-23 PROCEDURE — 91040 ESOPH BALLOON DISTENSION TST: CPT | Mod: TC | Performed by: INTERNAL MEDICINE

## 2022-05-23 PROCEDURE — 43239 PR EGD, FLEX, W/BIOPSY, SGL/MULTI: ICD-10-PCS | Mod: 59,,, | Performed by: INTERNAL MEDICINE

## 2022-05-23 PROCEDURE — 25000003 PHARM REV CODE 250: Performed by: NURSE ANESTHETIST, CERTIFIED REGISTERED

## 2022-05-23 PROCEDURE — 43239 EGD BIOPSY SINGLE/MULTIPLE: CPT | Performed by: INTERNAL MEDICINE

## 2022-05-23 PROCEDURE — 25000003 PHARM REV CODE 250: Performed by: INTERNAL MEDICINE

## 2022-05-23 PROCEDURE — 88312 SPECIAL STAINS GROUP 1: CPT | Performed by: PATHOLOGY

## 2022-05-23 PROCEDURE — 37000008 HC ANESTHESIA 1ST 15 MINUTES: Performed by: INTERNAL MEDICINE

## 2022-05-23 PROCEDURE — 27201012 HC FORCEPS, HOT/COLD, DISP: Performed by: INTERNAL MEDICINE

## 2022-05-23 PROCEDURE — 91040 PR ESOPH BALLOON DISTENSION TST: ICD-10-PCS | Mod: 26,,, | Performed by: INTERNAL MEDICINE

## 2022-05-23 PROCEDURE — 43239 EGD BIOPSY SINGLE/MULTIPLE: CPT | Mod: 59,,, | Performed by: INTERNAL MEDICINE

## 2022-05-23 PROCEDURE — D9220A PRA ANESTHESIA: ICD-10-PCS | Mod: ANES,,, | Performed by: ANESTHESIOLOGY

## 2022-05-23 PROCEDURE — C1726 CATH, BAL DIL, NON-VASCULAR: HCPCS | Performed by: INTERNAL MEDICINE

## 2022-05-23 PROCEDURE — D9220A PRA ANESTHESIA: ICD-10-PCS | Mod: CRNA,,, | Performed by: NURSE ANESTHETIST, CERTIFIED REGISTERED

## 2022-05-23 PROCEDURE — 88305 TISSUE EXAM BY PATHOLOGIST: CPT | Mod: 26,,, | Performed by: PATHOLOGY

## 2022-05-23 PROCEDURE — 88305 TISSUE EXAM BY PATHOLOGIST: ICD-10-PCS | Mod: 26,,, | Performed by: PATHOLOGY

## 2022-05-23 PROCEDURE — 88312 SPECIAL STAINS GROUP 1: CPT | Mod: 26,,, | Performed by: PATHOLOGY

## 2022-05-23 PROCEDURE — 91040 ESOPH BALLOON DISTENSION TST: CPT | Mod: 26,,, | Performed by: INTERNAL MEDICINE

## 2022-05-23 PROCEDURE — D9220A PRA ANESTHESIA: Mod: ANES,,, | Performed by: ANESTHESIOLOGY

## 2022-05-23 PROCEDURE — D9220A PRA ANESTHESIA: Mod: CRNA,,, | Performed by: NURSE ANESTHETIST, CERTIFIED REGISTERED

## 2022-05-23 PROCEDURE — 37000009 HC ANESTHESIA EA ADD 15 MINS: Performed by: INTERNAL MEDICINE

## 2022-05-23 RX ORDER — SODIUM CHLORIDE 9 MG/ML
INJECTION, SOLUTION INTRAVENOUS CONTINUOUS
Status: DISCONTINUED | OUTPATIENT
Start: 2022-05-23 | End: 2022-05-23 | Stop reason: HOSPADM

## 2022-05-23 RX ORDER — HYDROMORPHONE HYDROCHLORIDE 1 MG/ML
0.2 INJECTION, SOLUTION INTRAMUSCULAR; INTRAVENOUS; SUBCUTANEOUS EVERY 5 MIN PRN
Status: DISCONTINUED | OUTPATIENT
Start: 2022-05-23 | End: 2022-05-23 | Stop reason: HOSPADM

## 2022-05-23 RX ORDER — MEPERIDINE HYDROCHLORIDE 50 MG/ML
12.5 INJECTION INTRAMUSCULAR; INTRAVENOUS; SUBCUTANEOUS ONCE AS NEEDED
Status: DISCONTINUED | OUTPATIENT
Start: 2022-05-23 | End: 2022-05-23 | Stop reason: HOSPADM

## 2022-05-23 RX ORDER — PROPOFOL 10 MG/ML
VIAL (ML) INTRAVENOUS
Status: DISCONTINUED | OUTPATIENT
Start: 2022-05-23 | End: 2022-05-23

## 2022-05-23 RX ORDER — SODIUM CHLORIDE 0.9 % (FLUSH) 0.9 %
10 SYRINGE (ML) INJECTION
Status: DISCONTINUED | OUTPATIENT
Start: 2022-05-23 | End: 2022-05-23 | Stop reason: HOSPADM

## 2022-05-23 RX ORDER — LORAZEPAM 2 MG/ML
0.25 INJECTION INTRAMUSCULAR ONCE AS NEEDED
Status: DISCONTINUED | OUTPATIENT
Start: 2022-05-23 | End: 2022-05-23 | Stop reason: HOSPADM

## 2022-05-23 RX ORDER — LIDOCAINE HYDROCHLORIDE 20 MG/ML
INJECTION, SOLUTION EPIDURAL; INFILTRATION; INTRACAUDAL; PERINEURAL
Status: DISCONTINUED | OUTPATIENT
Start: 2022-05-23 | End: 2022-05-23

## 2022-05-23 RX ORDER — PROPOFOL 10 MG/ML
VIAL (ML) INTRAVENOUS CONTINUOUS PRN
Status: DISCONTINUED | OUTPATIENT
Start: 2022-05-23 | End: 2022-05-23

## 2022-05-23 RX ADMIN — PROPOFOL 100 MG: 10 INJECTION, EMULSION INTRAVENOUS at 09:05

## 2022-05-23 RX ADMIN — Medication 200 MCG/KG/MIN: at 09:05

## 2022-05-23 RX ADMIN — LIDOCAINE HYDROCHLORIDE 100 MG: 20 INJECTION, SOLUTION EPIDURAL; INFILTRATION; INTRACAUDAL at 09:05

## 2022-05-23 RX ADMIN — SODIUM CHLORIDE: 0.9 INJECTION, SOLUTION INTRAVENOUS at 09:05

## 2022-05-23 NOTE — TRANSFER OF CARE
"Anesthesia Transfer of Care Note    Patient: Sincere Way    Procedure(s) Performed: Procedure(s) (LRB):  ESOPHAGOGASTRODUODENOSCOPY (EGD) (N/A)    Patient location: PACU    Anesthesia Type: general    Transport from OR: Transported from OR on 6-10 L/min O2 by face mask with adequate spontaneous ventilation    Post pain: adequate analgesia    Post assessment: no apparent anesthetic complications and tolerated procedure well    Post vital signs: stable    Level of consciousness: awake, alert and oriented    Nausea/Vomiting: no nausea/vomiting    Complications: none    Transfer of care protocol was followed      Last vitals:   Visit Vitals  /68 (BP Location: Left arm, Patient Position: Lying)   Pulse 108   Temp 36.8 °C (98.2 °F) (Temporal)   Resp 16   Ht 6' 5" (1.956 m)   Wt 79.4 kg (175 lb)   SpO2 96%   BMI 20.75 kg/m²     "

## 2022-05-23 NOTE — H&P
Short Stay Endoscopy History and Physical    PCP - Amanda Richardson MD     Procedure - EGD/FLip  ASA - per anesthesia  Mallampati - per anesthesia  History of Anesthesia problems - no  Family history Anesthesia problems -  no   Plan of anesthesia - General    HPI:  This is a 21 y.o. male here for evaluation of dysphagia, r/o achalasia :        Medical History:  has a past medical history of Anxiety disorder, unspecified, COVID-19 (11/2020), Dysphagia, and GERD (gastroesophageal reflux disease).    Surgical History:  has a past surgical history that includes Appendectomy; [place] tooth extraction; and Esophageal manometry with measurement of impedance (N/A, 4/29/2022).    Family History: family history includes Breast cancer in his paternal grandmother; Colon polyps in his paternal grandfather; Diabetes in his father and paternal grandfather; Hiatal hernia in his maternal grandmother; Hyperlipidemia in his father; Hypertension in his mother; Lung cancer in his maternal grandfather; Pancreatic cancer in his paternal uncle; Pancreatitis in his maternal grandmother; Parkinsonism in his paternal grandfather.. Otherwise no colon cancer, inflammatory bowel disease, or GI malignancies.    Social History:  reports that he has never smoked. He has never used smokeless tobacco. He reports that he does not drink alcohol and does not use drugs.    Review of patient's allergies indicates:  No Known Allergies    Medications:   Medications Prior to Admission   Medication Sig Dispense Refill Last Dose    citalopram (CELEXA) 10 MG tablet Take 10 mg by mouth once daily.       dicyclomine (BENTYL) 10 MG capsule Take 10 mg by mouth 4 (four) times daily before meals and nightly.       esomeprazole (NEXIUM) 20 MG capsule Take 20 mg by mouth 2 (two) times daily.       sucralfate (CARAFATE) 1 gram tablet Take 1 g by mouth 4 (four) times daily.          Physical Exam:    Vital Signs: There were no vitals filed for this visit.    I  have explained the risks and benefits of endoscopy procedures to the patient including but not limited to bleeding, perforation, infection, and death.      Winter Solis MD

## 2022-05-23 NOTE — ANESTHESIA POSTPROCEDURE EVALUATION
Anesthesia Post Evaluation    Patient: Sincere Way    Procedure(s) Performed: Procedure(s) (LRB):  ESOPHAGOGASTRODUODENOSCOPY (EGD) (N/A)    Final Anesthesia Type: general      Patient location during evaluation: PACU  Patient participation: Yes- Able to Participate  Level of consciousness: awake and alert  Post-procedure vital signs: reviewed and stable  Pain management: adequate  Airway patency: patent    PONV status at discharge: No PONV  Anesthetic complications: no      Cardiovascular status: blood pressure returned to baseline  Respiratory status: spontaneous ventilation and room air  Hydration status: euvolemic  Follow-up not needed.          Vitals Value Taken Time   /59 05/23/22 1015   Temp 37.2 °C (98.9 °F) 05/23/22 1015   Pulse 96 05/23/22 1015   Resp 16 05/23/22 1015   SpO2 99 % 05/23/22 1015   Vitals shown include unvalidated device data.      No case tracking events are documented in the log.      Pain/Kristin Score: Kristin Score: 10 (5/23/2022 10:15 AM)

## 2022-05-23 NOTE — ANESTHESIA PREPROCEDURE EVALUATION
05/23/2022  Sincere Way is a 21 y.o., male.      Pre-op Assessment    I have reviewed the Patient Summary Reports.     I have reviewed the Nursing Notes.       Review of Systems  Anesthesia Hx:  No problems with previous Anesthesia    Hematology/Oncology:  Hematology Normal   Oncology Normal     EENT/Dental:EENT/Dental Normal   Cardiovascular:  Cardiovascular Normal     Pulmonary:  Pulmonary Normal    Renal/:  Renal/ Normal     Hepatic/GI:   GERD    Musculoskeletal:  Musculoskeletal Normal    Neurological:  Neurology Normal    Endocrine:  Endocrine Normal    Dermatological:  Skin Normal    Psych:   Psychiatric History          Physical Exam  General: Well nourished    Airway:  Mallampati: I   Mouth Opening: Normal  TM Distance: Normal  Tongue: Normal  Neck ROM: Normal ROM    Dental:  Intact        Anesthesia Plan  Type of Anesthesia, risks & benefits discussed:    Anesthesia Type: Gen Natural Airway  Intra-op Monitoring Plan: Standard ASA Monitors  Post Op Pain Control Plan: multimodal analgesia  Induction:  IV  Airway Plan: Direct  Informed Consent: Informed consent signed with the Patient and all parties understand the risks and agree with anesthesia plan.  All questions answered. Patient consented to blood products? No  ASA Score: 2  Day of Surgery Review of History & Physical: H&P Update referred to the surgeon/provider.    Ready For Surgery From Anesthesia Perspective.     .

## 2022-05-23 NOTE — PLAN OF CARE
Discharge instructions reviewed at bedside with patient and mother. Verbalized understanding. Packet given.

## 2022-05-27 LAB
FINAL PATHOLOGIC DIAGNOSIS: NORMAL
GROSS: NORMAL
Lab: NORMAL

## 2022-05-30 ENCOUNTER — TELEPHONE (OUTPATIENT)
Dept: GASTROENTEROLOGY | Facility: CLINIC | Age: 22
End: 2022-05-30
Payer: COMMERCIAL

## 2022-05-30 NOTE — TELEPHONE ENCOUNTER
----- Message from Winter Solis MD sent at 5/30/2022  3:24 PM CDT -----  I have received the biopsy results from your recent endoscopic procedure(s). They include the following:     No  significant abnormality    Endoscopy and colonoscopy is not a perfect exam. Rarely a significant polyp or cancer can be missed. You should not ignore symptoms such as blood with bowel movements or abdominal pain and report these symptoms to your doctor if they occur.     Sincerely,   Dr. Solis

## 2022-06-02 ENCOUNTER — OFFICE VISIT (OUTPATIENT)
Dept: GASTROENTEROLOGY | Facility: CLINIC | Age: 22
End: 2022-06-02
Payer: COMMERCIAL

## 2022-06-02 ENCOUNTER — OFFICE VISIT (OUTPATIENT)
Dept: SURGERY | Facility: CLINIC | Age: 22
End: 2022-06-02
Payer: COMMERCIAL

## 2022-06-02 ENCOUNTER — TELEPHONE (OUTPATIENT)
Dept: GASTROENTEROLOGY | Facility: CLINIC | Age: 22
End: 2022-06-02
Payer: COMMERCIAL

## 2022-06-02 VITALS
WEIGHT: 177.38 LBS | BODY MASS INDEX: 20.94 KG/M2 | HEART RATE: 70 BPM | DIASTOLIC BLOOD PRESSURE: 71 MMHG | SYSTOLIC BLOOD PRESSURE: 124 MMHG | HEIGHT: 77 IN

## 2022-06-02 VITALS
OXYGEN SATURATION: 100 % | WEIGHT: 176 LBS | HEART RATE: 79 BPM | HEIGHT: 77 IN | SYSTOLIC BLOOD PRESSURE: 129 MMHG | BODY MASS INDEX: 20.78 KG/M2 | DIASTOLIC BLOOD PRESSURE: 73 MMHG

## 2022-06-02 DIAGNOSIS — K22.0 ACHALASIA: Primary | ICD-10-CM

## 2022-06-02 PROCEDURE — 99214 OFFICE O/P EST MOD 30 MIN: CPT | Mod: S$GLB,,, | Performed by: INTERNAL MEDICINE

## 2022-06-02 PROCEDURE — 99214 PR OFFICE/OUTPT VISIT, EST, LEVL IV, 30-39 MIN: ICD-10-PCS | Mod: S$GLB,,, | Performed by: INTERNAL MEDICINE

## 2022-06-02 PROCEDURE — 3074F PR MOST RECENT SYSTOLIC BLOOD PRESSURE < 130 MM HG: ICD-10-PCS | Mod: CPTII,S$GLB,, | Performed by: SURGERY

## 2022-06-02 PROCEDURE — 99999 PR PBB SHADOW E&M-EST. PATIENT-LVL IV: CPT | Mod: PBBFAC,,, | Performed by: INTERNAL MEDICINE

## 2022-06-02 PROCEDURE — 3078F PR MOST RECENT DIASTOLIC BLOOD PRESSURE < 80 MM HG: ICD-10-PCS | Mod: CPTII,S$GLB,, | Performed by: SURGERY

## 2022-06-02 PROCEDURE — 99204 PR OFFICE/OUTPT VISIT, NEW, LEVL IV, 45-59 MIN: ICD-10-PCS | Mod: S$GLB,,, | Performed by: SURGERY

## 2022-06-02 PROCEDURE — 3078F DIAST BP <80 MM HG: CPT | Mod: CPTII,S$GLB,, | Performed by: INTERNAL MEDICINE

## 2022-06-02 PROCEDURE — 99999 PR PBB SHADOW E&M-EST. PATIENT-LVL IV: ICD-10-PCS | Mod: PBBFAC,,, | Performed by: INTERNAL MEDICINE

## 2022-06-02 PROCEDURE — 3078F DIAST BP <80 MM HG: CPT | Mod: CPTII,S$GLB,, | Performed by: SURGERY

## 2022-06-02 PROCEDURE — 3078F PR MOST RECENT DIASTOLIC BLOOD PRESSURE < 80 MM HG: ICD-10-PCS | Mod: CPTII,S$GLB,, | Performed by: INTERNAL MEDICINE

## 2022-06-02 PROCEDURE — 1159F PR MEDICATION LIST DOCUMENTED IN MEDICAL RECORD: ICD-10-PCS | Mod: CPTII,S$GLB,, | Performed by: SURGERY

## 2022-06-02 PROCEDURE — 3074F SYST BP LT 130 MM HG: CPT | Mod: CPTII,S$GLB,, | Performed by: INTERNAL MEDICINE

## 2022-06-02 PROCEDURE — 1159F MED LIST DOCD IN RCRD: CPT | Mod: CPTII,S$GLB,, | Performed by: SURGERY

## 2022-06-02 PROCEDURE — 3008F PR BODY MASS INDEX (BMI) DOCUMENTED: ICD-10-PCS | Mod: CPTII,S$GLB,, | Performed by: SURGERY

## 2022-06-02 PROCEDURE — 3074F SYST BP LT 130 MM HG: CPT | Mod: CPTII,S$GLB,, | Performed by: SURGERY

## 2022-06-02 PROCEDURE — 3008F BODY MASS INDEX DOCD: CPT | Mod: CPTII,S$GLB,, | Performed by: SURGERY

## 2022-06-02 PROCEDURE — 99204 OFFICE O/P NEW MOD 45 MIN: CPT | Mod: S$GLB,,, | Performed by: SURGERY

## 2022-06-02 PROCEDURE — 1159F PR MEDICATION LIST DOCUMENTED IN MEDICAL RECORD: ICD-10-PCS | Mod: CPTII,S$GLB,, | Performed by: INTERNAL MEDICINE

## 2022-06-02 PROCEDURE — 3008F PR BODY MASS INDEX (BMI) DOCUMENTED: ICD-10-PCS | Mod: CPTII,S$GLB,, | Performed by: INTERNAL MEDICINE

## 2022-06-02 PROCEDURE — 1159F MED LIST DOCD IN RCRD: CPT | Mod: CPTII,S$GLB,, | Performed by: INTERNAL MEDICINE

## 2022-06-02 PROCEDURE — 99999 PR PBB SHADOW E&M-EST. PATIENT-LVL III: CPT | Mod: PBBFAC,,, | Performed by: SURGERY

## 2022-06-02 PROCEDURE — 99999 PR PBB SHADOW E&M-EST. PATIENT-LVL III: ICD-10-PCS | Mod: PBBFAC,,, | Performed by: SURGERY

## 2022-06-02 PROCEDURE — 3008F BODY MASS INDEX DOCD: CPT | Mod: CPTII,S$GLB,, | Performed by: INTERNAL MEDICINE

## 2022-06-02 PROCEDURE — 3074F PR MOST RECENT SYSTOLIC BLOOD PRESSURE < 130 MM HG: ICD-10-PCS | Mod: CPTII,S$GLB,, | Performed by: INTERNAL MEDICINE

## 2022-06-02 NOTE — PATIENT INSTRUCTIONS
-See Dr. Marte to discuss treatment options     Patient education: Achalasia (Beyond the Basics)     Author:   Raulito Trinidad MD   Section :   BRIANNA Strange MD   [place] :   Ely Jackson MD, JOANNE, FACG   Contributor Disclosures   All topics are updated as new evidence becomes available and our peer review process is complete.   Literature review current through: May 2018.  This topic last updated: Aug 28, 2017.     ACHALASIA OVERVIEW - Achalasia is an uncommon swallowing disorder that affects about 1 in every 100,000 people. The major symptom of achalasia is usually difficulty with swallowing. Most people are diagnosed between the ages of 25 and 60 years. Although the condition cannot be cured, the symptoms can usually be controlled with treatment.     ACHALASIA CAUSE - In achalasia, nerve cells in the esophagus (the tube that carries food from the mouth to the stomach) degenerate for reasons that are not known. The loss of nerve cells in the esophagus causes two major problems that interfere with swallowing (figure 1):   The muscles that line the esophagus do not contract normally, so that swallowed food is not propelled through the esophagus and into the stomach properly.   The lower esophageal sphincter (LES), a band of muscle that encircles the lower portion of the esophagus, does not function correctly.     Normally, the LES relaxes when we swallow to allow swallowed food to enter the stomach. When the food has moved through the esophagus into the stomach, the LES muscle contracts to squeeze the end of the esophagus closed, thus preventing the stomach contents from flowing backwards (refluxing) into the esophagus.     In people with achalasia, the LES fails to relax normally with swallowing. Instead, the LES muscle continues to squeeze the end of the esophagus, creating a barrier that prevents food and liquids from passing into the stomach (figure 2). Over time, the esophagus above  "the persistently contracted LES dilates, and large volumes of food and saliva can accumulate in the dilated esophagus.     ACHALASIA SYMPTOMS - The most common symptom of achalasia is difficulty swallowing. Patients often experience the sensation that swallowed material, both solids and liquids, gets stuck in the chest. This problem often begins slowly and progresses gradually. Many people do not seek help until symptoms are advanced. Some people compensate by eating more slowly and by using maneuvers, such as lifting the neck or throwing the shoulders back, to improve emptying of the esophagus.   Other symptoms can include chest pain, regurgitation of swallowed food and liquid, heartburn, difficulty burping, a sensation of fullness or a lump in the throat, hiccups, and weight loss.     ACHALASIA DIAGNOSIS - Achalasia may be suspected based upon symptoms, but tests are needed to confirm the diagnosis.   Chest x-rays - A chest x-ray may reveal a dilated esophagus and absence of air in the stomach. However, a chest x-ray is not adequate for a diagnosis of achalasia and further testing is required.   Barium swallow test - The barium swallow test is a common screening test for achalasia. The test involves swallowing a chalky-tasting, thick mixture of barium while x-rays are taken. The barium shows the outline of the esophagus and lower esophageal sphincter (LES) (figure 2). Characteristic findings of achalasia on barium swallow include a persistently narrowed region at the end of the esophagus (the LES), with a dilated esophagus above the narrowed region. The barium swallow may also show spastic contractions in the esophagus above the LES, a condition called "vigorous achalasia".   Esophageal manometry (also called esophageal motility study) - Manometry is a test that measures changes in pressures within the esophagus that are caused by the contraction of the muscles that line the esophagus. The test involves the " "passage of a thin tube through the mouth or nose into the esophagus. The tube is lined by numerous pressure sensors that convey pressures within the esophagus to a device that records those pressures. Patients are usually instructed to have nothing to eat or drink for eight hours before the test, and they are given sips of water to swallow while the tube is in place.   Manometry is almost always used to confirm the diagnosis of achalasia. The test typically reveals three abnormalities in people with achalasia: high pressure in the LES at rest, failure of the LES to relax after swallowing, and an absence of useful (peristaltic) contractions in the lower esophagus. The last two features are the most important and are required to make the diagnosis of achalasia.   Endoscopy - Endoscopy allows the physician to see the inside of the esophagus, LES, and stomach using a thin, lighted, flexible tube. Most patients are given sedatives during the endoscopy procedure. This test is usually recommended for people with suspected achalasia and is especially useful for detecting other conditions that can mimic achalasia such as cancer of the upper portion of the stomach. (See "Patient education: Upper endoscopy (Beyond the Basics)".)   In people with achalasia, endoscopy often reveals a dilated esophagus that contains retained food; it may also reveal inflammation, small ulcers caused by residual food or pills, and candida (yeast) infection.   The endoscope can be advanced through the LES and into the stomach to check for stomach cancer. Cancer in the upper part of the stomach can produce symptoms and abnormal manometry results that are virtually identical to those of achalasia. This condition is called pseudoachalasia (meaning "false" achalasia) or secondary achalasia. Biopsies (small samples of tissue) are often obtained in the lower portion of the esophagus to look for cancer cells. Having a biopsy of the esophagus taken during " endoscopy is not painful and is generally a safe procedure.     ACHALASIA TREATMENT - Several options are available for the treatment of achalasia. Unfortunately, none can stop or reverse the underlying loss of nerve cells in the esophagus of patients with achalasia. However, the treatments are usually effective for improving symptoms.   None of the available treatments are expected to restore normal (peristaltic) contractions in the esophagus of patients with achalasia. Rather, the treatments aim to weaken the lower esophageal sphincter (LES) muscle to the point that it no longer poses a barrier to the passage of food. The LES can be weakened by drugs, or mechanically by procedures that tear or cut the LES muscle.   Drug therapy - Two classes of drugs, nitrates and calcium channel blockers, have LES muscle-relaxing effects. These drugs can decrease symptoms in people with achalasia. The drugs are usually taken by placing a pill under the tongue 10 to 30 minutes before meals.     Drug therapy is the least invasive option for treating achalasia. However, most people find that long-term drug therapy is inconvenient, ineffective, and often associated with unpleasant side effects, such as headache and low blood pressure. Furthermore, the drugs tend to become less effective over time. For these reasons, medications are recommended primarily for patients who are not interested in or not healthy enough for mechanical treatments such as balloon dilation and surgery (myotomy).     Balloon dilation (pneumatic dilatation) - For balloon dilation, the patient swallows a collapsed balloon that is positioned in the LES. An x-ray machine is often used to guide placement of the balloon. When the balloon has been positioned at the LES, it is inflated abruptly to a large size in order to tear the muscle of the LES. This procedure is effective for relieving the swallowing difficulty in patients with achalasia in approximately  two-thirds of people, although chest pain persists in some. Patients frequently require more than one balloon dilation treatment for adequate relief.   Procedure - If you have balloon dilation, you will be asked to drink only liquids for 12 hours to two days in advance (a longer period is recommended if you have a great deal of food retention in the esophagus). Using endoscopy and fluoroscopy (x-ray), a physician advances a guide wire down the esophagus and positions it inside the LES. A deflated balloon is then advanced along this guide wire, positioned inside the LES, and inflated for a variable period ranging from seconds to minutes. The balloon is then deflated and withdrawn, and you are monitored in a recovery area for a number of hours to detect any complications. After the balloon dilation, some physicians routinely perform an x-ray test similar to the barium swallow described above to make sure that the balloon has not created a hole (perforation) in the esophagus. If there are no complications, you can usually resume eating when you have recovered from the procedure. If your day-to-day symptoms do not improve, additional   dilations can be performed.   Success rate - A single balloon dilation session continues to relieve symptoms of achalasia in about 60 percent of people one year after the procedure and in about 25 percent of people five years after the procedure. Higher success rates have been reported in some studies. The success rate after longer periods has not been well studied, but some people have remained symptom-free for as long as 25 years.   Complications - About 15 percent of people experience severe chest pain immediately after balloon dilation and some experience fever.   The most serious complication of balloon dilation is creation of a hole (perforation) in the wall of the esophagus; this complication occurs in about 2 to 5 percent of people undergoing the procedure. Symptoms of persistent  or worsening pain in the hours after the procedure may indicate a perforation.     Perforations of the esophagus after balloon dilation are usually small. For the treatment of small perforations, your doctor will probably admit you to the hospital for intravenous feeding and antibiotic treatment. This usually results in healing of the perforation within one week without surgery. Large perforations usually require emergency surgery for repair. In some cases, your doctor might recommend surgery even for a small perforation. Another option for treatment of small perforations is the placement of an esophageal stent, which is a short plastic tube that is positioned in the esophagus to seal the perforation. The stent prevents swallowed material from entering the perforation, which enables the perforation to heal, but allows swallowed material to pass through the stent into the stomach. A key factor in the successful treatment of perforation of the esophagus is rapid identification of the perforation and rapid implementation of treatment. You should call your physician   immediately if you experience increasing pain after balloon dilation, especially if you develop a fever or chills.   Bleeding is another important, but infrequent complication of balloon dilation. This complication usually occurs immediately after the dilation. Symptoms of bleeding include dizziness or fainting, especially on standing up, vomiting of blood or material that looks like coffee grounds, and the passage of black or bloody stools. You should notify your doctor immediately if you experience these symptoms.   Patients also can develop gastroesophageal reflux disease (GERD) after balloon dilation. Because the LES is the principal barrier that prevents stomach contents from refluxing (backwashing) into the esophagus, LES disruption by balloon dilation can lead to acid reflux. GERD occurs in about 2 percent of people after balloon dilation, but is  "usually easily controlled with acid-reducing medications. (See "Patient education: Acid reflux (gastroesophageal reflux disease) in adults (Beyond the Basics)".)     Surgery (myotomy) - Myotomy is an operation that is used to weaken the LES by cutting its muscle fibers. The most common surgical technique used to treat achalasia is called the Heller myotomy, in which the surgeon cuts the muscles at the end of the esophagus and at the top of the stomach. In the past, this surgery was performed through a large (open) incision in the chest or abdomen. Today, this surgery is usually performed laparoscopically, using instruments and a television camera that are passed into the abdomen through small abdominal incisions. People who undergo laparoscopic myotomy are given general anesthesia, and generally stay in the hospital for one to two days.   Success rate - Surgery relieves symptoms in 70 to 90 percent of people. Symptom relief is sustained in about 85 percent of people 10 years after surgery and in about 65 percent of people 20 years after the surgery. Thus, some consider surgery to be a more definitive treatment for achalasia than balloon dilation or botulinum toxin injection (see below).   Complications - Postoperative pain is expected, and is treated with pain medications. Like balloon dilation, there is a risk of acid reflux following myotomy, which can cause damage to the esophagus over time.   During the operation for myotomy, surgeons often perform an additional procedure called a fundoplication in which a portion of the stomach is wrapped around the esophagus to prevent the reflux of stomach contents (figure 3). However, the fundoplication does not always prevent reflux, and it can cause additional complications such as difficulty swallowing, bloating, flatulence, and diarrhea. (See "Patient education: Acid reflux (gastroesophageal reflux disease) in adults (Beyond the Basics)".)     Peroral endoscopy myotomy - " Peroral endoscopic myotomy (POEM) is a newer endoscopic technique for performing myotomy of the LES. In POEM, the endoscopist passes an electrical scalpel through the endoscope to make an incision in the lining of the esophagus and to create a tunnel within the wall of the esophagus (between the inner lining of the esophagus and the outer muscle layer of the esophagus). The endoscope is advanced into that tunnel, and the muscle of the esophagus can be cut using an electrical scalpel device that is passed through the endoscope. Early studies suggest that POEM can achieve results comparable to or even better than those of pneumatic dilation and surgical myotomy, and with similar safety. However, POEM is a newer procedure, and little is known of its long-term outcome. Since POEM is not routinely combined with a procedure to prevent the reflux of gastric contents, problematic GERD can occur after POEM. Presently, POEM is being performed in a limited number of   centers by a small number of endoscopists, and the procedure may not be widely available. Differences in the experience and skill of the endoscopists who perform POEM also might have considerable influence on the outcome of the procedure.     Botulinum toxin injection - Botulinum toxin injections temporarily paralyze the nerves that signal the LES to contract, thereby helping to relieve the obstruction. Botulinum toxin injection also is used occasionally as a diagnostic test for people who appear to have achalasia but who have inconclusive test results.   Procedure - The injection procedure is performed during endoscopy, while the patient is sedated. The botulinum toxin is injected through the lining of the esophagus directly into the LES muscle.   Success rate - A single botulinum toxin injection session relieves symptoms in 65 to 90 percent of people in the short term (three months to approximately one year). Additional injections can relieve symptoms in  "patients whose symptoms return. Botulinum toxin injection is more likely to be effective in people over the age of 50 years and in people who have the vigorous form of achalasia.   When compared with balloon dilation, botulinum toxin has a similar effectiveness for relieving symptoms in the first one to two years after the procedure; however, prolonged effectiveness requires multiple botulinum toxin injections because the paralyzing effect of the toxin is temporary. The long-term safety and effectiveness of botulinum toxin injection are unknown.   Complications - About 25 percent of people have chest pain for a few hours after the procedure and about 5 percent develop heartburn. Damage to the esophageal wall and lining are rare. The short-term safety of botulinum toxin injection is greater than the short-term safety of both balloon dilation and surgery; this greater short-term safety may make botulinum toxin injection a better choice for patients with other serious medical conditions (eg, advanced age, severe heart or lung problems) who cannot tolerate a balloon dilation or myotomy.     LONG-TERM RISK OF ESOPHAGEAL CANCER - People with achalasia have an increased risk of developing esophageal cancer. Your doctor might recommend endoscopy for early detection of this cancer. (See "Patient education: Upper endoscopy (Beyond the Basics)".)     ACHALASIA FOLLOW-UP - Since none of the treatments for achalasia cure the underlying disease, regular follow-up is needed. The goal is to recognize and treat recurrent symptoms or complications of treatment (eg, acid reflux) early. Recognizing and treating these problems can help to prevent the development of severe enlargement of the esophagus ([place]-esophagus) and cancer, which could require surgical removal of the entire esophagus.    "

## 2022-06-02 NOTE — PROGRESS NOTES
[facility] Consultation Note    Reason for Consult:    Chief Complaint   Patient presents with    Dysphagia         PCP:   Amanda Morris-Irineo   [address] [facility] [address]    Referring MD:  Cachorro Swenson Md  [address] [facility]  [address]  [place] [address]      HPI:  Sincere Way is a 21 y.o. male referred to motility clinic for second opinion regarding the following problems:    GERD.    Retrosternal pyrosis: occasional      MEDs:  Nexium 20mg daily - no longer taking     Regurgitates white foamy liquids: daily     Dysphagia.  With every meal          Chest pain with and without eating. Occasional     Eckardt Symptom Score  Dysphagia: 3  Regurgitation: 1  Retrosternal pain: 1  Weight Loss: 0  Total: 5    Onset: after COVID 2020    Abd pain.  IBS-D  Improved w celexa     Weight loss Stable   15 lb initially, than gained some  back  Now 178lb from 180lb    Vasovagal episodes   Seen by cardiology  Echo, EKG normal     Anxiety (abd pain w stress)  Not aware of anxiety   On celexa per PCP   Never seen a counselor     Insomnia. Improved w PPI     Denies nausea, early satiety, abdominal pain, bloating, diarrhea, constipation, BRBPR, melena      Previous Studies:   EGD 05/23/2022 normal esophagus. Possible mild candidiasis (-).  Fluid in the esophagus.  Intermittent puckering, resistance no pop at GE junction.  Z-line regular.  Normal stomach (-).  Normal duodenum.  Flip with borderline GE junction outflow obstruction absent contractility.  Manometry 05/05/2022:  Achalasia type 2. Normal LES pressure with incomplete relaxation and pan esophageal pressurization.  Incomplete bolus clearance.  Evidence of residual liquid at the end of 200 cc bolus.  No evidence of rumination.  Esophagram 02/14/2022:  Significant narrowing of distal esophagus at the level of the GE junction with lack of passage of barium tablet concerning for stricture.  Abnormal thickening of the walls  of the stomach with similar appearance of thickening of the esophagus concerning for gastrin esophagitis.  Questionable ulceration middle to distal esophagus.  Initial images suggestive of possible retention of fluid within the mid and distal esophagus  EGD 09/21/2021:  Normal stomach normal duodenum normal upper 3rd of esophagus (-).  Stenosis at GE junction.  Dilation 54 Pakistani.  HIDA 08/24/2021:  There is no evidence of acute cholecystitis.  Normal gallbladder ejection fraction.  Ultrasound gallbladder 7/28/2021:2 small gallbladder wall polyps measuring 5 mm and 6 mm.   CT abdomen 06/17/2021:  There is a small cyst of the anterior left liver lobe and there is a punctate cyst of the inferior right liver lobe.  No abnormality of gallbladder, pancreas or spleen.  No evidence of acute inflammation in the abdomen.  No free fluid or free air.  No abnormality of the adrenals or kidneys.  Clips in the right lower quadrant from the appendectomy.    Upper GI series 05/31/2021:  Unremarkable.  Esophagus appears normal in caliber and course and motility.  There is no reflux.  No hiatal hernia.  Normal stomach normal duodenum normal jejunum.      Relevant Surgical History:    None     ROS:  ROS   Constitutional: No fevers, no chills, no night sweats, no weight loss  ENT: No congestion, no rhinorrhea, no chronic sinus problems  CV: No chest pain, no palpitations  Pulm: No cough, no shortness of breath  Ophtho: No blurry vision, no eye redness  GI: see HPI  Derm: No rash  Heme: No lymphadenopathy, no bruising  MSK: No arthritis, no joint swelling, no Raynauds  : No dysuria, no frequent urination, no blood in urine  Endo: No hot or cold intolerance  Neuro: + dizziness, no syncope, no seizure  Psych: No anxiety, no depression  Complete ROS negative except as above    Medical History:   Past Medical History:   Diagnosis Date    Anxiety disorder, unspecified     COVID-19 11/2020    Dysphagia     GERD (gastroesophageal reflux  disease)         Surgical History:   Past Surgical History:   Procedure Laterality Date    APPENDECTOMY      ESOPHAGEAL MANOMETRY WITH MEASUREMENT OF IMPEDANCE N/A 4/29/2022    Procedure: MANOMETRY-ESOPHAGEAL-WITH IMPEDANCE;  Surgeon: Winter Solis MD;  Location: [facility] END[facility] (4TH FLR);  Service: Endoscopy;  Laterality: N/A;  r/o rumination  fully vaccinated, instructions sent to [facility] and emailed to [email or web address]    ESOPHAGOGASTRODUODENOSCOPY N/A 5/23/2022    Procedure: ESOPHAGOGASTRODUODENOSCOPY (EGD);  Surgeon: Winter Solis MD;  Location: [facility] ENDO (2ND FLR);  Service: Endoscopy;  Laterality: N/A;  3 days full liquids 1 day clears-cardiac clearance see te 5/4/22-vacc-inst email-tb-ok to use this spot per prevot-    WISDOM TOOTH EXTRACTION          Family History:   Family History   Problem Relation Age of Onset    Hypertension Mother     Diabetes Father     Hyperlipidemia Father     Pancreatic cancer Paternal Uncle     Pancreatitis Maternal Grandmother     Hiatal hernia Maternal Grandmother     Lung cancer Maternal Grandfather     Breast cancer Paternal Grandmother     Colon polyps Paternal Grandfather     Diabetes Paternal Grandfather     Parkinsonism Paternal Grandfather     Celiac disease Neg Hx     Colon cancer Neg Hx     Crohn's disease Neg Hx     Esophageal cancer Neg Hx     Inflammatory bowel disease Neg Hx     Irritable bowel syndrome Neg Hx     Liver cancer Neg Hx     Rectal cancer Neg Hx     Stomach cancer Neg Hx     Ulcerative colitis Neg Hx         Social History:   Social History     Socioeconomic History    Marital status: Single   Tobacco Use    Smoking status: Never Smoker    Smokeless tobacco: Never Used   Substance and Sexual Activity    Alcohol use: No    Drug use: No        Review of patient's allergies indicates:  No Known Allergies    Current Outpatient Medications   Medication Sig Dispense Refill    citalopram (CELEXA) 10 MG tablet Take 10  "mg by mouth once daily.      esomeprazole (NEXIUM) 20 MG capsule Take 20 mg by mouth 2 (two) times daily.      sucralfate (CARAFATE) 1 gram tablet Take 1 g by mouth 4 (four) times daily.       No current facility-administered medications for this visit.        Objective Findings:  Vital Signs:  /73   Pulse 79   Ht 6' 5" (1.956 m)   Wt 79.8 kg (176 lb)   SpO2 100%   BMI 20.87 kg/m²   Body mass index is 20.87 kg/m².    Physical Exam:  General appearance: alert, cooperative, no distress  HENT: Normocephalic, atraumatic, neck symmetrical, no nasal discharge  Eyes: conjunctivae/corneas clear, PERRL, EOM's intact  Lungs: clear to auscultation bilaterally, no dullness to percussion bilaterally  Heart: regular rate and rhythm without rub; no displacement of the PMI  Abdomen: soft, non-tender; bowel sounds normoactive; no organomegaly  Extremities: extremities symmetric; no clubbing, cyanosis, or edema  Integument: Skin color, texture, turgor normal; no rashes; hair distrubution normal  Neurologic: Alert and oriented X 3, normal strength, normal coordination and gait  Psychiatric: no pressured speech; normal affect; no evidence of impaired cognition    Labs:   Reviewed in Epic/record      Assessment and Plan:  Sincere Way is a 21 y.o. male with referred to Esophageal [facility]ility Clinic for 2nd opinion regarding following problems:    GERD related to achalasia   -Stop nexium 20mg daily   -Tums prn     Dysphagia to solids and liquids.    Regurgitation of white foamy liquid  Chest pain/pyrosis  Eckard 5/12 from 6/12  Esophagogram w stricture at GEJ, tablet did not pass.   EGD w puckering, resistance, fluid in esophagus  Flip w borderline GEJOO and absent contractility   Manometry w Achalasia Type II   No improvement with dilation 54Fr without improvement  -Discussed pathophysiology, presentation and treatment of achalasia, including botox, pneumatic dilation, myotomy, and POEM.    -Patient would like to proceed with " either POEM or surgery     -Refer to Dr. López     Abnormal thickening of the walls of the stomach and esophagus  EGD negative     Abd pain.  IBS-D  Resolved w celexa   -Def to primary GI     Weight loss  Now  Stable     Gb polyps   -Def to primary GI     Pre syncope  Nengative cardiac kaufman   Pending ref to Dr. Marcelino in [place] for autonomic dysfunction     Anxiety. Not aware of feeling anxious. Sx: abd pain w stress  On celexa per PCP   Never seen a counselor     Follow up in about 6 months (around 12/2/2022).    1. Achalasia          Order summary:       Discussed with PT that I act as a consult service and do not accept patients to be their primary GI provider. Discussed that the goal of our visits is to address relevant motility problems while deferring other GI problems as well as screening and surveillance to his/her primary GI provider.   Discussed that he/she needs to continue to follow with his local primary GI provider.  Discussed that we will complete his/her workup, clarify diagnosis and attempt to optimize his/her symptoms with intention of him/her returning to referring GI provider for long term GI care.   Pt verbalized understanding.        Thank you so much for allowing me to participate in the care of Sincere Way      Winter Soils MD      This note was created using voice recognition software, and may contain some unrecognized transcriptional errors.

## 2022-06-02 NOTE — PROGRESS NOTES
History & Physical    SUBJECTIVE:     History of Present Illness:  Patient is a 21-year-old man here with complaints of dysphagia to solids more so than liquids.  Upper GI was normal and EGD done 09/2021 demonstrated a lower esophageal stricture which was dilated with a balloon to 54 Thai.  This did nothing for his symptoms.  Recent EGD, UGI, and Manometry consistent with achalasia type II.  He continues to have bolus impactions and frequently has to vomit his food up.   He was having abdominal pain which has been relieved by citalopram.  Symptoms have worsened to every meal.  Interested in surgical intervention.    Eckardt Symptom Score  Dysphagia: 3  Regurgitation: 1  Retrosternal pain: 1  Weight Loss: 0  Total: 5    Review of patient's allergies indicates:  No Known Allergies    Current Outpatient Medications   Medication Sig Dispense Refill    citalopram (CELEXA) 10 MG tablet Take 10 mg by mouth once daily.      dicyclomine (BENTYL) 10 MG capsule Take 10 mg by mouth 4 (four) times daily before meals and nightly.      esomeprazole (NEXIUM) 20 MG capsule Take 20 mg by mouth 2 (two) times daily.      sucralfate (CARAFATE) 1 gram tablet Take 1 g by mouth 4 (four) times daily.       No current facility-administered medications for this visit.       Past Medical History:   Diagnosis Date    Anxiety disorder, unspecified     COVID-19 11/2020    Dysphagia     GERD (gastroesophageal reflux disease)      Past Surgical History:   Procedure Laterality Date    APPENDECTOMY      ESOPHAGEAL MANOMETRY WITH MEASUREMENT OF IMPEDANCE N/A 4/29/2022    Procedure: MANOMETRY-ESOPHAGEAL-WITH IMPEDANCE;  Surgeon: Winter Solis MD;  Location: [address]);  Service: Endoscopy;  Laterality: N/A;  r/o rumination  fully vaccinated, instructions [facility] to [facility] and emailed to [email or web address]    ESOPHAGOGASTRODUODENOSCOPY N/A 5/23/2022    Procedure: ESOPHAGOGASTRODUODENOSCOPY (EGD);  Surgeon: Winter RINCON  MD Will;  Location: [facility] ([address] [place] [address]);  Service: Endoscopy;  Laterality: N/A;  3 days full liquids 1 day clears-cardiac clearance see te 5/4/22-vacc-inst email-tb-ok to use this spot per prevot-    WISDOM TOOTH EXTRACTION       Family History   Problem Relation Age of Onset    Hypertension Mother     Diabetes Father     Hyperlipidemia Father     Pancreatic cancer Paternal Uncle     Pancreatitis Maternal Grandmother     Hiatal hernia Maternal Grandmother     Lung cancer Maternal Grandfather     Breast cancer Paternal Grandmother     Colon polyps Paternal Grandfather     Diabetes Paternal Grandfather     Parkinsonism Paternal Grandfather     Celiac disease Neg Hx     Colon cancer Neg Hx     Crohn's disease Neg Hx     Esophageal cancer Neg Hx     Inflammatory bowel disease Neg Hx     Irritable bowel syndrome Neg Hx     Liver cancer Neg Hx     Rectal cancer Neg Hx     Stomach cancer Neg Hx     Ulcerative colitis Neg Hx      Social History     Tobacco Use    Smoking status: Never Smoker    Smokeless tobacco: Never Used   Substance Use Topics    Alcohol use: No    Drug use: No        Review of Systems:  Review of Systems   Constitutional: Negative for activity change, appetite change, chills, diaphoresis, fatigue and fever.   HENT: Negative for congestion, sinus pressure, sneezing and sore throat.    Eyes: Negative for pain, discharge, redness, itching and visual disturbance.   Respiratory: Negative for apnea, cough, choking, chest tightness and shortness of breath.    Cardiovascular: Negative for chest pain and palpitations.   Gastrointestinal: Negative for abdominal distention, abdominal pain, constipation, diarrhea, nausea and vomiting.        + Dysphagia   Musculoskeletal: Negative for arthralgias, back pain and gait problem.   Skin: Negative for color change, pallor and rash.   Neurological: Negative for dizziness, facial asymmetry, light-headedness and headaches.    Psychiatric/Behavioral: Negative for agitation, behavioral problems and confusion.       OBJECTIVE:     Vital Signs (Most Recent)  Pulse: 70 (06/02/22 1415)  BP: 124/71 (06/02/22 1415)          Physical Exam:  Physical Exam  Constitutional:       General: He is not in acute distress.     Appearance: Normal appearance. He is not diaphoretic.   HENT:      Head: Normocephalic and atraumatic.      Right Ear: External ear normal.      Left Ear: External ear normal.   Eyes:      General: No scleral icterus.        Right eye: No discharge.         Left eye: No discharge.   Cardiovascular:      Rate and Rhythm: Normal rate and regular rhythm.   Pulmonary:      Effort: Pulmonary effort is normal. No respiratory distress.   Abdominal:      General: There is no distension.      Palpations: Abdomen is soft. There is no mass.      Tenderness: There is no abdominal tenderness. There is no guarding or rebound.      Hernia: No hernia is present.   Musculoskeletal:         General: Normal range of motion.   Skin:     General: Skin is warm and dry.      Coloration: Skin is not pale.      Findings: No erythema or rash.   Neurological:      Mental Status: He is alert and oriented to person, place, and time.   Psychiatric:         Mood and Affect: Mood normal.         Behavior: Behavior normal.         Thought Content: Thought content normal.         Judgment: Judgment normal.         Diagnostic Results:  Monometry + for Achalasia Type II  UGI with signs of stricture  EGD with tightness at LES.    ASSESSMENT/PLAN:     Achalasia Type II    PLAN:Plan     Will plan for OR for either POEM or Heller with [facility].  Both discussed at length with the patient.    Pt would like to consider both prior to making a final decision  Will check coverage with insurance prior to final decision.  Call with question or concerns       Elio Marte MD

## 2022-06-13 ENCOUNTER — TELEPHONE (OUTPATIENT)
Dept: SURGERY | Facility: CLINIC | Age: 22
End: 2022-06-13
Payer: COMMERCIAL

## 2022-06-13 NOTE — TELEPHONE ENCOUNTER
----- Message from Lynn Bhakta sent at 6/13/2022  7:53 AM CDT -----  Pt mother andriy would like to be called back regarding  insurance covering surgery     Pt can be reached at [phone]      Called and spoke to Andriy.  Stated that they are still considering which type of surgery that Sincere will have and that she is waiting to see if the POEM get approved.  Explained that I did not see where the process has been started, but I do not have access to Pre-Authorization screens.  Explained that I will send a message to Pre-Authorization to make sure that they have started the process of Insurance approval.  She said that they have been waiting since 6-2.  She did not have any other questions.

## 2022-06-14 ENCOUNTER — TELEPHONE (OUTPATIENT)
Dept: SURGERY | Facility: CLINIC | Age: 22
End: 2022-06-14
Payer: COMMERCIAL

## 2022-06-14 NOTE — TELEPHONE ENCOUNTER
I checked on the Referral for Insurance Authorization and sent an In Basket to NAKITA Montoya who is working on his referral.  Said that there was no Authorization required and he can proceed with surgery.      Called and spoke to the Patient's Mother - Sonia.  Explained that the POEM was approved.  Asked when he could have surgery.  Explained that surgery is scheduled for 7-19-22.  Mother asked why a date was picked and not given to them.  Explained that we had to pick a date to have the Insurance process started.  It is also the first date the Dr. Marte is available to do a POEM.  Asked for the arrival time.  Explained that we call the day before with his arrival time.     Mother stated that she will check the date with her Son and call back.

## 2022-06-17 ENCOUNTER — TELEPHONE (OUTPATIENT)
Dept: SURGERY | Facility: CLINIC | Age: 22
End: 2022-06-17
Payer: COMMERCIAL

## 2022-06-17 NOTE — TELEPHONE ENCOUNTER
"  Received a call from the Patient's Mother.  She stated that Sincere wants to proceed with the POEM procedure and 7-19-22 is a good day for surgery.  Reviewed some pre-op instructions and the pre-and post-op diets.  Surgery Guide booklet and diet sheet mailed to the Patient.     Mother stated that Sincere is fine with the procedure, but she is nervous because it is "investigational."  Asked if they would like to come back to meet with Dr. Marte to discuss it and she said that they did not feel that it was necessary.  Explained that Dr. Marte has been performing the procedure for the past 3-4 years and probably does 1-2 POEM procedures per month.  I mailed 2 articles along with the Surgery Guide booklet.  She did not have any further questions at present.  "

## 2022-07-07 ENCOUNTER — TELEPHONE (OUTPATIENT)
Dept: SURGERY | Facility: CLINIC | Age: 22
End: 2022-07-07
Payer: COMMERCIAL

## 2022-07-07 NOTE — TELEPHONE ENCOUNTER
Had a message from the Patient's Mother stating that they were having trouble faxing over his FMLA paperwork.  She was asking for my e-mail address.  Called and left a phone message with my e-mail address.

## 2022-07-13 ENCOUNTER — OCCUPATIONAL HEALTH (OUTPATIENT)
Dept: URGENT CARE | Facility: CLINIC | Age: 22
End: 2022-07-13

## 2022-07-13 DIAGNOSIS — R05.9 COUGH: Primary | ICD-10-CM

## 2022-07-13 DIAGNOSIS — R05.9 COUGH: ICD-10-CM

## 2022-07-13 LAB
CTP QC/QA: YES
SARS-COV-2 RDRP RESP QL NAA+PROBE: POSITIVE

## 2022-07-13 PROCEDURE — U0002 COVID-19 LAB TEST NON-CDC: HCPCS | Mod: QW,S$GLB,, | Performed by: EMERGENCY MEDICINE

## 2022-07-13 PROCEDURE — U0002: ICD-10-PCS | Mod: QW,S$GLB,, | Performed by: EMERGENCY MEDICINE

## 2022-07-18 ENCOUNTER — TELEPHONE (OUTPATIENT)
Dept: SURGERY | Facility: CLINIC | Age: 22
End: 2022-07-18
Payer: COMMERCIAL

## 2022-07-18 ENCOUNTER — ANESTHESIA EVENT (OUTPATIENT)
Dept: SURGERY | Facility: HOSPITAL | Age: 22
End: 2022-07-18
Payer: COMMERCIAL

## 2022-07-18 NOTE — TELEPHONE ENCOUNTER
Returned pt phone call in regards to rescheduling pt.      Spoke with pt mother in regards to rescheduling his surgery.  The would like to take the next available date.  I rescheduled them for the next available date of 8/25.  Would like to be moved up if there is a cancellation.    Message sent to make sure that Dr. Jamison is available if needed.

## 2022-07-19 ENCOUNTER — ANESTHESIA (OUTPATIENT)
Dept: SURGERY | Facility: HOSPITAL | Age: 22
End: 2022-07-19
Payer: COMMERCIAL

## 2022-07-29 ENCOUNTER — TELEPHONE (OUTPATIENT)
Dept: SURGERY | Facility: CLINIC | Age: 22
End: 2022-07-29
Payer: COMMERCIAL

## 2022-07-29 NOTE — TELEPHONE ENCOUNTER
Received a phone call from Sincere's Mother - Sonia.  She stated that Sincere's job is asking for new Leave paperwork for his new surgery date of 8-25-22.  Faxed the paperwork with the new surgery and leave dates.  Received confirmation that the fax went through.

## 2022-08-24 ENCOUNTER — TELEPHONE (OUTPATIENT)
Dept: SURGERY | Facility: CLINIC | Age: 22
End: 2022-08-24
Payer: COMMERCIAL

## 2022-08-24 NOTE — ANESTHESIA PREPROCEDURE EVALUATION
[facility]  Anesthesia Pre-Operative Evaluation         Patient Name: Sincere Way  YOB: 2000  MRN: [id number]    SUBJECTIVE:     Pre-operative evaluation for Procedure(s) (LRB):  MYOTOMY, PERORAL, ENDOSCOPIC POEM (N/A)     08/24/2022    Sincere Way is a 21 y.o. male w/ GERD, anxiety, and dysphagia who presents for the above procedure.    Prev airway: None documented.     Patient Active Problem List   Diagnosis    GERD (gastroesophageal reflux disease)    Vasovagal near syncope    Esophageal stricture    Anxiety       Review of patient's allergies indicates:  No Known Allergies    Current Inpatient Medications:      No current facility-administered medications on file prior to encounter.     Current Outpatient Medications on File Prior to Encounter   Medication Sig Dispense Refill    citalopram (CELEXA) 10 MG tablet Take 10 mg by mouth once daily.      esomeprazole (NEXIUM) 20 MG capsule Take 20 mg by mouth 2 (two) times daily.      sucralfate (CARAFATE) 1 gram tablet Take 1 g by mouth 4 (four) times daily.         Past Surgical History:   Procedure Laterality Date    APPENDECTOMY      ESOPHAGEAL MANOMETRY WITH MEASUREMENT OF IMPEDANCE N/A 4/29/2022    Procedure: MANOMETRY-ESOPHAGEAL-WITH IMPEDANCE;  Surgeon: Winter Solis MD;  Location: [facility] (4TH FLR);  Service: Endoscopy;  Laterality: N/A;  r/o rumination  fully vaccinated, instructions sent to [facility] and emailed to [email or web address]    ESOPHAGOGASTRODUODENOSCOPY N/A 5/23/2022    Procedure: ESOPHAGOGASTRODUODENOSCOPY (EGD);  Surgeon: Winter Solis MD;  Location: [facility] (2ND FLR);  Service: Endoscopy;  Laterality: N/A;  3 days full liquids 1 day clears-cardiac clearance see te 5/4/22-vacc-inst email-tb-ok to use this spot per prevot-    WISDOM TOOTH EXTRACTION         Social History     Socioeconomic History    Marital status: Single   Tobacco Use    Smoking status: Never Smoker     Smokeless tobacco: Never Used   Substance and Sexual Activity    Alcohol use: No    Drug use: No       OBJECTIVE:     Vital Signs Range (Last 24H):         CBC:   No results for input(s): WBC, RBC, HGB, HCT, PLT, MCV, MCH, MCHC in the last 72 hours.    CMP: No results for input(s): NA, K, CL, CO2, BUN, CREATININE, GLU, MG, PHOS, CALCIUM, ALBUMIN, PROT, ALKPHOS, ALT, AST, BILITOT in the last 72 hours.    INR:  No results for input(s): PT, INR, PROTIME, APTT in the last 72 hours.    Diagnostic Studies: No relevant studies.    EKG:   Results for orders placed or performed in visit on 03/29/22   IN OFFICE EKG 12-LEAD (to [place])    Collection Time: 03/29/22  2:51 PM    Narrative    Test Reason : R55,    Vent. Rate : 080 BPM     Atrial Rate : 080 BPM     P-R Int : 140 ms          QRS Dur : 100 ms      QT Int : 356 ms       P-R-T Axes : 068 091 065 degrees     QTc Int : 410 ms    Normal sinus rhythm  Rightward axis  Borderline Abnormal ECG  No previous ECGs available  Confirmed by Bienvenido Peguero MD ([id number]) on 3/29/2022 3:22:17 PM    Referred By: TAMRA WHITE           Confirmed By:Bienvenido Peguero MD        2D ECHO:   Results for orders placed during the hospital encounter of 04/11/22    Echo    Interpretation Summary  · The left ventricle is normal in size with normal systolic function. The estimated ejection fraction is 65%.  · Normal right ventricular size with normal right ventricular systolic function.  · Normal left ventricular diastolic function.  · The estimated PA systolic pressure is 14 mmHg.  · Normal central venous pressure (3 mmHg).         ASSESSMENT/PLAN:         Pre-op Assessment    I have reviewed the Patient Summary Reports.     I have reviewed the Nursing Notes. I have reviewed the NPO Status.   I have reviewed the Medications.     Review of Systems  Anesthesia Hx:  No problems with previous Anesthesia Denies Hx of Anesthetic complications  History of prior surgery of interest to airway management or  planning: Previous anesthesia: MAC, General Denies Family Hx of Anesthesia complications.   Denies Personal Hx of Anesthesia complications.   Social:  Non-Smoker, No Alcohol Use    Hematology/Oncology:         -- Denies Anemia: Denies Current/Recent Cancer   Cardiovascular:   Denies Hypertension.  Denies CAD.    Denies Dysrhythmias.   Denies CHF. no hyperlipidemia    Pulmonary:   Denies COPD.  Denies Asthma.  Denies Sleep Apnea.    Renal/:   Denies Chronic Renal Disease.     Hepatic/GI:   GERD Denies Liver Disease. Esophageal stricture   Musculoskeletal:   Denies Arthritis.     Neurological:   Denies CVA. Denies Neuromuscular Disease.  Denies Seizures.   Denies Chronic Pain Syndrome   Endocrine:   Denies Diabetes. Denies Hyperthyroidism.  Denies Obesity / BMI > 30  Psych:   Psychiatric History anxiety denies depression          Physical Exam  General: Well nourished    Airway:  Mallampati: I   Mouth Opening: Normal  TM Distance: Normal  Tongue: Normal  Neck ROM: Normal ROM    Dental:  Intact        Anesthesia Plan  Type of Anesthesia, risks & benefits discussed:    Anesthesia Type: Gen ETT  Intra-op Monitoring Plan: Standard ASA Monitors  Post Op Pain Control Plan: multimodal analgesia and IV/PO Opioids PRN  Induction:  IV  Airway Plan: Direct and Video, Post-Induction  Informed Consent: Informed consent signed with the Patient and all parties understand the risks and agree with anesthesia plan.  All questions answered.   ASA Score: 2  Day of Surgery Review of History & Physical: H&P Update referred to the surgeon/provider.    Ready For Surgery From Anesthesia Perspective.     .

## 2022-08-24 NOTE — PRE-PROCEDURE INSTRUCTIONS
PreOp Instructions given:   - Verbal medication information (what to hold and what to take)   - NPO guidelines   - Arrival place directions given; time to be given the day before procedure by the   Surgeon's Office DOSC  - Bathing with antibacterial soap   - Don't wear any jewelry or bring any valuables AM of surgery   - No makeup or moisturizer to face   - No perfume/cologne, powder, lotions or aftershave   Pt. verbalized understanding.   Pt denies any h/o Anesthesia/Sedation complications or side effects.  Patient does not know arrival time.  Explained that this information comes from the surgeon's office and if they haven't heard from them by 2 or 3 pm to call the office.  Patient stated an understanding.

## 2022-08-25 ENCOUNTER — HOSPITAL ENCOUNTER (OUTPATIENT)
Facility: HOSPITAL | Age: 22
Discharge: HOME | End: 2022-08-26
Attending: SURGERY | Admitting: SURGERY
Payer: COMMERCIAL

## 2022-08-25 DIAGNOSIS — K22.0 ACHALASIA: Primary | ICD-10-CM

## 2022-08-25 PROCEDURE — 27200997: Performed by: SURGERY

## 2022-08-25 PROCEDURE — 63600175 PHARM REV CODE 636 W HCPCS

## 2022-08-25 PROCEDURE — 63600175 PHARM REV CODE 636 W HCPCS: Performed by: STUDENT IN AN ORGANIZED HEALTH CARE EDUCATION/TRAINING PROGRAM

## 2022-08-25 PROCEDURE — 37000009 HC ANESTHESIA EA ADD 15 MINS: Performed by: SURGERY

## 2022-08-25 PROCEDURE — 36000706: Performed by: SURGERY

## 2022-08-25 PROCEDURE — 43497 PR MYOTOMY, LOWER ESOPH, TRANSORAL: ICD-10-PCS | Mod: ,,, | Performed by: SURGERY

## 2022-08-25 PROCEDURE — 25000003 PHARM REV CODE 250: Performed by: SURGERY

## 2022-08-25 PROCEDURE — 27201423 OPTIME MED/SURG SUP & DEVICES STERILE SUPPLY: Performed by: SURGERY

## 2022-08-25 PROCEDURE — 27202299 HC NEEDLE KNIFE, TISSUE RESECTION: Performed by: SURGERY

## 2022-08-25 PROCEDURE — 27201243: Performed by: SURGERY

## 2022-08-25 PROCEDURE — 43497 TRANSORL LWR ESOPHGL MYOTOMY: CPT | Mod: ,,, | Performed by: SURGERY

## 2022-08-25 PROCEDURE — 27201028 HC NEEDLE, SCLERO: Performed by: SURGERY

## 2022-08-25 PROCEDURE — 36000707: Performed by: SURGERY

## 2022-08-25 PROCEDURE — 71000015 HC POSTOP RECOV 1ST HR: Performed by: SURGERY

## 2022-08-25 PROCEDURE — 94761 N-INVAS EAR/PLS OXIMETRY MLT: CPT

## 2022-08-25 PROCEDURE — 37000008 HC ANESTHESIA 1ST 15 MINUTES: Performed by: SURGERY

## 2022-08-25 PROCEDURE — 25000003 PHARM REV CODE 250

## 2022-08-25 PROCEDURE — 71000033 HC RECOVERY, INTIAL HOUR: Performed by: SURGERY

## 2022-08-25 PROCEDURE — G0378 HOSPITAL OBSERVATION PER HR: HCPCS

## 2022-08-25 PROCEDURE — 27202363 HC INJECTION AGENT, SUBMUCOSAL, ANY: Performed by: SURGERY

## 2022-08-25 PROCEDURE — 71000016 HC POSTOP RECOV ADDL HR: Performed by: SURGERY

## 2022-08-25 PROCEDURE — 63600175 PHARM REV CODE 636 W HCPCS: Mod: JG | Performed by: SURGERY

## 2022-08-25 PROCEDURE — C1889 IMPLANT/INSERT DEVICE, NOC: HCPCS | Performed by: SURGERY

## 2022-08-25 PROCEDURE — D9220A PRA ANESTHESIA: ICD-10-PCS | Mod: ,,, | Performed by: ANESTHESIOLOGY

## 2022-08-25 PROCEDURE — D9220A PRA ANESTHESIA: Mod: ,,, | Performed by: ANESTHESIOLOGY

## 2022-08-25 RX ORDER — PROCHLORPERAZINE EDISYLATE 5 MG/ML
5 INJECTION INTRAMUSCULAR; INTRAVENOUS EVERY 6 HOURS PRN
Status: DISCONTINUED | OUTPATIENT
Start: 2022-08-25 | End: 2022-08-26 | Stop reason: HOSPADM

## 2022-08-25 RX ORDER — PHENYLEPHRINE HCL IN 0.9% NACL 1 MG/10 ML
SYRINGE (ML) INTRAVENOUS
Status: DISCONTINUED | OUTPATIENT
Start: 2022-08-25 | End: 2022-08-25

## 2022-08-25 RX ORDER — MIDAZOLAM HYDROCHLORIDE 1 MG/ML
INJECTION, SOLUTION INTRAMUSCULAR; INTRAVENOUS
Status: DISCONTINUED | OUTPATIENT
Start: 2022-08-25 | End: 2022-08-25

## 2022-08-25 RX ORDER — NEOSTIGMINE METHYLSULFATE 0.5 MG/ML
INJECTION, SOLUTION INTRAVENOUS
Status: DISCONTINUED | OUTPATIENT
Start: 2022-08-25 | End: 2022-08-25

## 2022-08-25 RX ORDER — KETOROLAC TROMETHAMINE 30 MG/ML
15 INJECTION, SOLUTION INTRAMUSCULAR; INTRAVENOUS EVERY 8 HOURS PRN
Status: DISCONTINUED | OUTPATIENT
Start: 2022-08-25 | End: 2022-08-26 | Stop reason: HOSPADM

## 2022-08-25 RX ORDER — SODIUM CHLORIDE, SODIUM LACTATE, POTASSIUM CHLORIDE, CALCIUM CHLORIDE 600; 310; 30; 20 MG/100ML; MG/100ML; MG/100ML; MG/100ML
INJECTION, SOLUTION INTRAVENOUS CONTINUOUS
Status: DISCONTINUED | OUTPATIENT
Start: 2022-08-25 | End: 2022-08-26 | Stop reason: HOSPADM

## 2022-08-25 RX ORDER — ROCURONIUM BROMIDE 10 MG/ML
INJECTION, SOLUTION INTRAVENOUS
Status: DISCONTINUED | OUTPATIENT
Start: 2022-08-25 | End: 2022-08-25

## 2022-08-25 RX ORDER — HYDROMORPHONE HYDROCHLORIDE 1 MG/ML
0.2 INJECTION, SOLUTION INTRAMUSCULAR; INTRAVENOUS; SUBCUTANEOUS EVERY 6 HOURS PRN
Status: DISCONTINUED | OUTPATIENT
Start: 2022-08-25 | End: 2022-08-26 | Stop reason: HOSPADM

## 2022-08-25 RX ORDER — PROPOFOL 10 MG/ML
VIAL (ML) INTRAVENOUS
Status: DISCONTINUED | OUTPATIENT
Start: 2022-08-25 | End: 2022-08-25

## 2022-08-25 RX ORDER — CEFAZOLIN SODIUM/WATER 2 G/20 ML
2 SYRINGE (ML) INTRAVENOUS
Status: COMPLETED | OUTPATIENT
Start: 2022-08-25 | End: 2022-08-25

## 2022-08-25 RX ORDER — METHYLENE BLUE 5 MG/ML
INJECTION INTRAVENOUS
Status: COMPLETED | OUTPATIENT
Start: 2022-08-25 | End: 2022-08-25

## 2022-08-25 RX ORDER — ONDANSETRON 2 MG/ML
INJECTION INTRAMUSCULAR; INTRAVENOUS
Status: DISCONTINUED | OUTPATIENT
Start: 2022-08-25 | End: 2022-08-25

## 2022-08-25 RX ORDER — FENTANYL CITRATE 50 UG/ML
INJECTION, SOLUTION INTRAMUSCULAR; INTRAVENOUS
Status: DISCONTINUED | OUTPATIENT
Start: 2022-08-25 | End: 2022-08-25

## 2022-08-25 RX ORDER — LIDOCAINE HYDROCHLORIDE 10 MG/ML
INJECTION, SOLUTION EPIDURAL; INFILTRATION; INTRACAUDAL; PERINEURAL
Status: DISCONTINUED | OUTPATIENT
Start: 2022-08-25 | End: 2022-08-25

## 2022-08-25 RX ORDER — SODIUM CHLORIDE 0.9 % (FLUSH) 0.9 %
10 SYRINGE (ML) INJECTION
Status: DISCONTINUED | OUTPATIENT
Start: 2022-08-25 | End: 2022-08-26 | Stop reason: HOSPADM

## 2022-08-25 RX ORDER — ONDANSETRON 2 MG/ML
4 INJECTION INTRAMUSCULAR; INTRAVENOUS EVERY 6 HOURS PRN
Status: DISCONTINUED | OUTPATIENT
Start: 2022-08-25 | End: 2022-08-26 | Stop reason: HOSPADM

## 2022-08-25 RX ORDER — KETAMINE HCL IN 0.9 % NACL 50 MG/5 ML
SYRINGE (ML) INTRAVENOUS
Status: DISCONTINUED | OUTPATIENT
Start: 2022-08-25 | End: 2022-08-25

## 2022-08-25 RX ORDER — DEXAMETHASONE SODIUM PHOSPHATE 4 MG/ML
INJECTION, SOLUTION INTRA-ARTICULAR; INTRALESIONAL; INTRAMUSCULAR; INTRAVENOUS; SOFT TISSUE
Status: DISCONTINUED | OUTPATIENT
Start: 2022-08-25 | End: 2022-08-25

## 2022-08-25 RX ORDER — ENOXAPARIN SODIUM 100 MG/ML
40 INJECTION SUBCUTANEOUS EVERY 24 HOURS
Status: DISCONTINUED | OUTPATIENT
Start: 2022-08-26 | End: 2022-08-26 | Stop reason: HOSPADM

## 2022-08-25 RX ORDER — HALOPERIDOL 5 MG/ML
0.5 INJECTION INTRAMUSCULAR EVERY 10 MIN PRN
Status: DISCONTINUED | OUTPATIENT
Start: 2022-08-25 | End: 2022-08-25 | Stop reason: HOSPADM

## 2022-08-25 RX ORDER — DEXMEDETOMIDINE HYDROCHLORIDE 100 UG/ML
INJECTION, SOLUTION INTRAVENOUS
Status: DISCONTINUED | OUTPATIENT
Start: 2022-08-25 | End: 2022-08-25

## 2022-08-25 RX ORDER — HYDROMORPHONE HYDROCHLORIDE 1 MG/ML
0.2 INJECTION, SOLUTION INTRAMUSCULAR; INTRAVENOUS; SUBCUTANEOUS
Status: DISCONTINUED | OUTPATIENT
Start: 2022-08-25 | End: 2022-08-25 | Stop reason: HOSPADM

## 2022-08-25 RX ADMIN — SODIUM CHLORIDE: 9 INJECTION, SOLUTION INTRAVENOUS at 01:08

## 2022-08-25 RX ADMIN — ONDANSETRON 4 MG: 2 INJECTION INTRAMUSCULAR; INTRAVENOUS at 02:08

## 2022-08-25 RX ADMIN — HYDROMORPHONE HYDROCHLORIDE 0.2 MG: 1 INJECTION, SOLUTION INTRAMUSCULAR; INTRAVENOUS; SUBCUTANEOUS at 04:08

## 2022-08-25 RX ADMIN — Medication 10 MG: at 12:08

## 2022-08-25 RX ADMIN — Medication 10 MG: at 01:08

## 2022-08-25 RX ADMIN — MIDAZOLAM 2 MG: 1 INJECTION INTRAMUSCULAR; INTRAVENOUS at 11:08

## 2022-08-25 RX ADMIN — DEXMEDETOMIDINE HYDROCHLORIDE 8 MCG: 100 INJECTION, SOLUTION INTRAVENOUS at 12:08

## 2022-08-25 RX ADMIN — DEXAMETHASONE SODIUM PHOSPHATE 4 MG: 4 INJECTION INTRA-ARTICULAR; INTRALESIONAL; INTRAMUSCULAR; INTRAVENOUS; SOFT TISSUE at 12:08

## 2022-08-25 RX ADMIN — SODIUM CHLORIDE: 9 INJECTION, SOLUTION INTRAVENOUS at 11:08

## 2022-08-25 RX ADMIN — HYDROMORPHONE HYDROCHLORIDE 0.2 MG: 1 INJECTION, SOLUTION INTRAMUSCULAR; INTRAVENOUS; SUBCUTANEOUS at 03:08

## 2022-08-25 RX ADMIN — SODIUM CHLORIDE, POTASSIUM CHLORIDE, SODIUM LACTATE AND CALCIUM CHLORIDE: 600; 310; 30; 20 INJECTION, SOLUTION INTRAVENOUS at 04:08

## 2022-08-25 RX ADMIN — GLYCOPYRROLATE 0.6 MG: 0.2 INJECTION INTRAMUSCULAR; INTRAVENOUS at 02:08

## 2022-08-25 RX ADMIN — GLYCOPYRROLATE 0.1 MG: 0.2 INJECTION INTRAMUSCULAR; INTRAVENOUS at 12:08

## 2022-08-25 RX ADMIN — ROCURONIUM BROMIDE 50 MG: 50 INJECTION, SOLUTION INTRAVENOUS at 12:08

## 2022-08-25 RX ADMIN — Medication 2 G: at 12:08

## 2022-08-25 RX ADMIN — DEXMEDETOMIDINE HYDROCHLORIDE 8 MCG: 100 INJECTION, SOLUTION INTRAVENOUS at 01:08

## 2022-08-25 RX ADMIN — LIDOCAINE HYDROCHLORIDE 50 MG: 10 INJECTION, SOLUTION EPIDURAL; INFILTRATION; INTRACAUDAL at 12:08

## 2022-08-25 RX ADMIN — NEOSTIGMINE METHYLSULFATE 5 MG: 0.5 INJECTION, SOLUTION INTRAVENOUS at 02:08

## 2022-08-25 RX ADMIN — PROPOFOL 200 MG: 10 INJECTION, EMULSION INTRAVENOUS at 12:08

## 2022-08-25 RX ADMIN — FENTANYL CITRATE 100 MCG: 0.05 INJECTION, SOLUTION INTRAMUSCULAR; INTRAVENOUS at 12:08

## 2022-08-25 RX ADMIN — Medication 20 MG: at 12:08

## 2022-08-25 RX ADMIN — FENTANYL CITRATE 25 MCG: 0.05 INJECTION, SOLUTION INTRAMUSCULAR; INTRAVENOUS at 01:08

## 2022-08-25 RX ADMIN — Medication 100 MCG: at 01:08

## 2022-08-25 RX ADMIN — DEXMEDETOMIDINE HYDROCHLORIDE 8 MCG: 100 INJECTION, SOLUTION INTRAVENOUS at 02:08

## 2022-08-25 RX ADMIN — FENTANYL CITRATE 50 MCG: 0.05 INJECTION, SOLUTION INTRAMUSCULAR; INTRAVENOUS at 12:08

## 2022-08-25 NOTE — ANESTHESIA PROCEDURE NOTES
Intubation    Date/Time: 8/25/2022 12:05 PM  Performed by: Thad Aden MD  Authorized by: Yordan Spencer Jr., MD     Intubation:     Induction:  Intravenous    Intubated:  Postinduction    Mask Ventilation:  Easy mask    Attempts:  1    Attempted By:  CRNA    Method of Intubation:  Direct    Blade:  Yadav 2    Laryngeal View Grade: Grade I - full view of cords      Difficult Airway Encountered?: No      Complications:  None    Airway Device:  Oral endotracheal tube    Airway Device Size:  7.5    Style/Cuff Inflation:  Cuffed (inflated to minimal occlusive pressure)    Inflation Amount (mL):  7    Tube secured:  24    Secured at:  The lips    Placement Verified By:  Capnometry    Complicating Factors:  None    Findings Post-Intubation:  BS equal bilateral and atraumatic/condition of teeth unchanged

## 2022-08-25 NOTE — TRANSFER OF CARE
Anesthesia Transfer of Care Note    Patient: Sincere [address]    Procedure(s) Performed: Procedure(s) (LRB):  MYOTOMY, PERORAL, ENDOSCOPIC POEM (N/A)    Patient location: PACU    Anesthesia Type: general    Transport from OR: Transported from OR on 6-10 L/min O2 by face mask with adequate spontaneous ventilation    Post pain: adequate analgesia    Post assessment: no apparent anesthetic complications    Post vital signs: stable    Level of consciousness: awake and alert    Nausea/Vomiting: no nausea/vomiting    Complications: none    Transfer of care protocol was followed      Last vitals:   Visit Vitals  BP (!) 141/87 (BP Location: Right arm, Patient Position: Lying)   Pulse 88   Temp 36.6 °C (97.9 °F) (Oral)   Resp 18   Wt 73.9 kg (163 lb)   SpO2 99%   BMI 19.33 kg/m²

## 2022-08-25 NOTE — NURSING TRANSFER
Nursing Transfer Note      8/25/2022     Reason patient is being transferred: INPT    Transfer To: 508    Transfer via stretcher    Transfer with IVF     Transported by transport    Medicines sent: none    Any special needs or follow-up needed: none    Chart send with patient: Yes    Notified: mother/father     Patient reassessed at: 8/25/22

## 2022-08-25 NOTE — ANESTHESIA POSTPROCEDURE EVALUATION
Anesthesia Post Evaluation    Patient: Sincere Way    Procedure(s) Performed: Procedure(s) (LRB):  MYOTOMY, PERORAL, ENDOSCOPIC POEM (N/A)    Final Anesthesia Type: general      Patient location during evaluation: PACU  Patient participation: Yes- Able to Participate  Level of consciousness: awake and alert  Post-procedure vital signs: reviewed and stable  Pain management: adequate  Airway patency: patent    PONV status at discharge: No PONV  Anesthetic complications: no      Cardiovascular status: blood pressure returned to baseline  Respiratory status: spontaneous ventilation and room air  Hydration status: euvolemic  Follow-up not needed.          Vitals Value Taken Time   /73 08/25/22 1532   Temp 36.7 °C (98 °F) 08/25/22 1445   Pulse 90 08/25/22 1535   Resp 14 08/25/22 1535   SpO2 100 % 08/25/22 1535   Vitals shown include unvalidated device data.      No case tracking events are documented in the log.      Pain/Kristin Score: Kristin Score: 9 (8/25/2022  3:00 PM)

## 2022-08-25 NOTE — H&P
History & Physical   SUBJECTIVE:   History of Present Illness:   Patient is a 21-year-old man here with complaints of dysphagia to solids more so than liquids. Upper GI was normal and EGD done 09/2021 demonstrated a lower esophageal stricture which was dilated with a balloon to 54 [place]. This did nothing for his symptoms. Recent EGD, UGI, and Manometry consistent with achalasia type II. He continues to have bolus impactions and frequently has to vomit his food up. He was having abdominal pain which has been relieved by citalopram. Symptoms have worsened to every meal. Interested in surgical intervention.   Eckardt Symptom Score   Dysphagia: 3   Regurgitation: 1   Retrosternal pain: 1   Weight Loss: 0   Total: 5   Review of patient's allergies indicates:   No Known Allergies   Current Medications                                                                  Past Medical History:   Diagnosis Date    Anxiety disorder, unspecified     COVID-19 11/2020    Dysphagia     GERD (gastroesophageal reflux disease)            Past Surgical History:   Procedure Laterality Date    APPENDECTOMY      ESOPHAGEAL MANOMETRY WITH MEASUREMENT OF IMPEDANCE N/A 4/29/2022    Procedure: MANOMETRY-ESOPHAGEAL-WITH IMPEDANCE; Surgeon: Winter Solis MD; Location: [facility] (4TH FLR); Service: Endoscopy; Laterality: N/A; r/o rumination   fully vaccinated, instructions sent to [facility] and emailed to [email or web address]    ESOPHAGOGASTRODUODENOSCOPY N/A 5/23/2022    Procedure: ESOPHAGOGASTRODUODENOSCOPY (EGD); Surgeon: Winter Solis MD; Location: [facility] (2ND FLR); Service: Endoscopy; Laterality: N/A; 3 days full liquids 1 day clears-cardiac clearance see te 5/4/22-vacc-inst email-tb-ok to use this spot per prevot-    WISDOM TOOTH EXTRACTION             Family History   Problem Relation Age of Onset    Hypertension Mother     Diabetes Father     Hyperlipidemia Father     Pancreatic cancer Paternal Uncle      Pancreatitis Maternal Grandmother     Hiatal hernia Maternal Grandmother     Lung cancer Maternal Grandfather     Breast cancer Paternal Grandmother     Colon polyps Paternal Grandfather     Diabetes Paternal Grandfather     Parkinsonism Paternal Grandfather     Celiac disease Neg Hx     Colon cancer Neg Hx     Crohn's disease Neg Hx     Esophageal cancer Neg Hx     Inflammatory bowel disease Neg Hx     Irritable bowel syndrome Neg Hx     Liver cancer Neg Hx     Rectal cancer Neg Hx     Stomach cancer Neg Hx     Ulcerative colitis Neg Hx      Social History          Tobacco Use    Smoking status: Never Smoker    Smokeless tobacco: Never Used   Substance Use Topics    Alcohol use: No    Drug use: No     Review of Systems:   Review of Systems   Constitutional: Negative for activity change, appetite change, chills, diaphoresis, fatigue and fever.   HENT: Negative for congestion, sinus pressure, sneezing and sore throat.   Eyes: Negative for pain, discharge, redness, itching and visual disturbance.   Respiratory: Negative for apnea, cough, choking, chest tightness and shortness of breath.   Cardiovascular: Negative for chest pain and palpitations.   Gastrointestinal: Negative for abdominal distention, abdominal pain, constipation, diarrhea, nausea and vomiting.   + Dysphagia   Musculoskeletal: Negative for arthralgias, back pain and gait problem.   Skin: Negative for color change, pallor and rash.   Neurological: Negative for dizziness, facial asymmetry, light-headedness and headaches.   Psychiatric/Behavioral: Negative for agitation, behavioral problems and confusion.     OBJECTIVE:   Vital Signs (Most Recent)   Pulse: 70 (06/02/22 1415)   BP: 124/71 (06/02/22 1415)       Physical Exam:   Physical Exam   Constitutional:   General: He is not in acute distress.  Appearance: Normal appearance. He is not diaphoretic.   HENT:   Head: Normocephalic and atraumatic.   Right Ear: External ear normal.   Left  Ear: External ear normal.   Eyes:   General: No scleral icterus.   Right eye: No discharge.   Left eye: No discharge.   Cardiovascular:   Rate and Rhythm: Normal rate and regular rhythm.   Pulmonary:   Effort: Pulmonary effort is normal. No respiratory distress.   Abdominal:   General: There is no distension.   Palpations: Abdomen is soft. There is no mass.   Tenderness: There is no abdominal tenderness. There is no guarding or rebound.   Hernia: No hernia is present.   Musculoskeletal:   General: Normal range of motion.   Skin:   General: Skin is warm and dry.   Coloration: Skin is not pale.   Findings: No erythema or rash.   Neurological:   Mental Status: He is alert and oriented to person, place, and time.   Psychiatric:   Mood and Affect: Mood normal.   Behavior: Behavior normal.   Thought Content: Thought content normal.   Judgment: Judgment normal.     Diagnostic Results:   Monometry + for Achalasia Type II   UGI with signs of stricture   EGD with tightness at LES.   ASSESSMENT/PLAN:   Achalasia Type II   PLAN:Plan     -POEM today    Aamir Lovelace, PGY V  Surgery

## 2022-08-25 NOTE — OP NOTE
DATE OF PROCEDURE: 08/25/2022   SERVICE: General Surgery.   Surgeon(s) and Role:     * Elio Marte Jr., MD - Primary     * Aamir Lovelace MD - Resident - Chief  PREOPERATIVE DIAGNOSES: Achalasia, Type II   POSTOPERATIVE DIAGNOSES:Same  PROCEDURE: Per Oral Endoscopic Myotomy  ANESTHESIA: General endotracheal.   DESCRIPTION OF PROCEDURE:  Patient was taken to the operating room and placed under general anesthesia. At this time we began with an EGD.  The scope was placed into the oropharynx and guided down the esophagus into the stomach without difficulty is then guided through the stomach and into the 1st portion of the duodenum. There were no abnormalities noted in the duodenum or the stomach. The GE junction was able to be passed without significant difficulty or tension. The Z-line was then identified and measured at 46 cm.  Mucosotomy followed by myotomy were performed in an anterior orientation. First, a submucosal injection of a solution of ORISE was used to lift the mucosa at the site of the initial mucosotomy. The initial mucosal incision was made longitudinally starting at 36 cm from the incisors using an ERBE knife. Next, the endoscope with a clear cap was used to enter into the submucosal tunnel. The submucosal tunnel was then further created by continued dissection. The submucosal tunnel was extended passing the LES into the cardia.  The myotomy was started at 38 cm from the incisors using a ERBE knife to perform a circular myotomy (in an anterior orientation). The myotomy was extended passing the LES to 48cm. Intra procedure bleeding was minimal. A small Coagrasper was used effectively for hemostasis as needed. Hemostasis was successfully accomplished throughout the procedure. After completion of the myotomy, there was no evidence of bleeding noted on the inspection of the myotomy edges and submucosal tunnel. The myotomy was successfully performed to 48cm. The muscular division was complete. The  mucosal entrance to the submucosal tunnel was closed using endoscopic clips (5).  During this there was noted to be a significant amount of air in the abdominal cavity.  We felt it would be best to decompress the abdominal cavity.  An area in the RUQ was prepped with chlorhexidine.  An 11 blade was then used to make a small stab incision in the skin.  A Veress needle was inserted.  Air was noted to come from the needle.  We suction the needle, no signs of blood or bowel contents.  Water was dropped into the syringe and it flowed easily into the abdomen.  An open syringe with saline was attached to the needle and bubbles were noted.  The abdomen was significantly less tense.  COMPLICATIONS: None.   SPONGE COUNT: Correct.   BLOOD LOSS: 5 mL.   FLUIDS: Per Anesthesia.   BLOOD GIVEN: None.   DRAINS: None.   SPECIMENS: None  CONDITION OF THE PATIENT: Good.   I was present for the entire procedure.

## 2022-08-26 VITALS
BODY MASS INDEX: 19.33 KG/M2 | OXYGEN SATURATION: 100 % | RESPIRATION RATE: 18 BRPM | DIASTOLIC BLOOD PRESSURE: 63 MMHG | TEMPERATURE: 99 F | SYSTOLIC BLOOD PRESSURE: 116 MMHG | HEART RATE: 92 BPM | WEIGHT: 163 LBS

## 2022-08-26 LAB
ANION GAP SERPL CALC-SCNC: 11 MMOL/L (ref 8–16)
BASOPHILS # BLD AUTO: 0.02 K/UL (ref 0–0.2)
BASOPHILS NFR BLD: 0.2 % (ref 0–1.9)
BUN SERPL-MCNC: 7 MG/DL (ref 6–20)
CALCIUM SERPL-MCNC: 9.3 MG/DL (ref 8.7–10.5)
CHLORIDE SERPL-SCNC: 106 MMOL/L (ref 95–110)
CO2 SERPL-SCNC: 25 MMOL/L (ref 23–29)
CREAT SERPL-MCNC: 0.9 MG/DL (ref 0.5–1.4)
DIFFERENTIAL METHOD: ABNORMAL
EOSINOPHIL # BLD AUTO: 0 K/UL (ref 0–0.5)
EOSINOPHIL NFR BLD: 0.1 % (ref 0–8)
ERYTHROCYTE [DISTWIDTH] IN BLOOD BY AUTOMATED COUNT: 12.2 % (ref 11.5–14.5)
EST. GFR  (NO RACE VARIABLE): >60 ML/MIN/1.73 M^2
GLUCOSE SERPL-MCNC: 88 MG/DL (ref 70–110)
HCT VFR BLD AUTO: 40.2 % (ref 40–54)
HGB BLD-MCNC: 14.1 G/DL (ref 14–18)
IMM GRANULOCYTES # BLD AUTO: 0.04 K/UL (ref 0–0.04)
IMM GRANULOCYTES NFR BLD AUTO: 0.5 % (ref 0–0.5)
LYMPHOCYTES # BLD AUTO: 1 K/UL (ref 1–4.8)
LYMPHOCYTES NFR BLD: 11.4 % (ref 18–48)
MCH RBC QN AUTO: 29.9 PG (ref 27–31)
MCHC RBC AUTO-ENTMCNC: 35.1 G/DL (ref 32–36)
MCV RBC AUTO: 85 FL (ref 82–98)
MONOCYTES # BLD AUTO: 0.9 K/UL (ref 0.3–1)
MONOCYTES NFR BLD: 10.9 % (ref 4–15)
NEUTROPHILS # BLD AUTO: 6.4 K/UL (ref 1.8–7.7)
NEUTROPHILS NFR BLD: 76.9 % (ref 38–73)
NRBC BLD-RTO: 0 /100 WBC
PLATELET # BLD AUTO: 160 K/UL (ref 150–450)
PMV BLD AUTO: 12.9 FL (ref 9.2–12.9)
POTASSIUM SERPL-SCNC: 4.5 MMOL/L (ref 3.5–5.1)
RBC # BLD AUTO: 4.72 M/UL (ref 4.6–6.2)
SODIUM SERPL-SCNC: 142 MMOL/L (ref 136–145)
WBC # BLD AUTO: 8.36 K/UL (ref 3.9–12.7)

## 2022-08-26 PROCEDURE — 85025 COMPLETE CBC W/AUTO DIFF WBC: CPT | Performed by: STUDENT IN AN ORGANIZED HEALTH CARE EDUCATION/TRAINING PROGRAM

## 2022-08-26 PROCEDURE — 36415 COLL VENOUS BLD VENIPUNCTURE: CPT | Performed by: STUDENT IN AN ORGANIZED HEALTH CARE EDUCATION/TRAINING PROGRAM

## 2022-08-26 PROCEDURE — 25500020 PHARM REV CODE 255: Performed by: SURGERY

## 2022-08-26 PROCEDURE — 63600175 PHARM REV CODE 636 W HCPCS: Performed by: STUDENT IN AN ORGANIZED HEALTH CARE EDUCATION/TRAINING PROGRAM

## 2022-08-26 PROCEDURE — 80048 BASIC METABOLIC PNL TOTAL CA: CPT | Performed by: STUDENT IN AN ORGANIZED HEALTH CARE EDUCATION/TRAINING PROGRAM

## 2022-08-26 RX ADMIN — KETOROLAC TROMETHAMINE 15 MG: 30 INJECTION, SOLUTION INTRAMUSCULAR; INTRAVENOUS at 05:08

## 2022-08-26 RX ADMIN — SODIUM CHLORIDE, POTASSIUM CHLORIDE, SODIUM LACTATE AND CALCIUM CHLORIDE: 600; 310; 30; 20 INJECTION, SOLUTION INTRAVENOUS at 11:08

## 2022-08-26 RX ADMIN — IOHEXOL 50 ML: 350 INJECTION, SOLUTION INTRAVENOUS at 08:08

## 2022-08-26 RX ADMIN — SODIUM CHLORIDE, POTASSIUM CHLORIDE, SODIUM LACTATE AND CALCIUM CHLORIDE: 600; 310; 30; 20 INJECTION, SOLUTION INTRAVENOUS at 12:08

## 2022-08-26 NOTE — PLAN OF CARE
[facility] - [facility]  Discharge Final Note    Primary Care Provider: Amanda Richardson MD    Expected Discharge Date: 8/26/2022    Final Discharge Note (most recent)     Final Note - 08/26/22 1418        Final Note    Assessment Type Final Discharge Note     Anticipated Discharge Disposition Home or Self Care     What phone number can be called within the next 1-3 days to see how you are doing after discharge? --   [phone]    Hospital Resources/Appts/Education Provided Appointments scheduled and added to AVS                 Important Message from Medicare             Contact Info     Elio Marte Jr, MD   Specialty: General Surgery, Bariatrics    [address]  [facility] [address]   Phone: [phone]       Next Steps: Follow up in 2 week(s)    Instructions: post op POEM            Patient discharged home to care of family on 8/26/22.

## 2022-08-26 NOTE — NURSING
Discharge instruction and education provided. Patient voices understanding of discharge instructions.. IV site removed cath tip intact.Patient shows no acute distress.

## 2022-08-26 NOTE — HOSPITAL COURSE
Please see the preoperative H&P and other available documentation for full details related to history prior to this admission.  Briefly, Sincere Way is a 21 y.o. male who was admitted following scheduled elective surgery for Achalasia. They underwent the following procedures: peroral endoscopic myotomy    Following a complete preoperative discussion of the risks and benefits of surgery with signed informed consent, the patient was taken to the operating room on 8/25/2022 and underwent the above stated procedures. The patient tolerated surgery well and there were no complications. Please see the operative report for full intraoperative findings and details. Postoperatively, the patient did well and was transferred from the PACU to the floor in stable condition where they had a stable and uncomplicated hospital course. UGI performed POD1 revealed no leak. Labs and vital signs remained stable and appropriate throughout course. Diet was advanced as tolerated to CLD and the patient's pain was controlled on oral pain medications without problem. Ambulating without issue. Voiding without issue with adequate urine output. Passing gas and stool. Incision site is clean, dry, and intact.    Currently, the patient is doing well at 1 Day Post-Op and is stable and appropriate for discharge home at this time. Patient will follow up in clinic with Dr. Marte in 2 weeks.

## 2022-08-26 NOTE — PLAN OF CARE
[facility] - [facility]  Discharge Assessment    Primary Care Provider: Amanda Richardson MD     Discharge Assessment (most recent)     BRIEF DISCHARGE ASSESSMENT - 08/26/22 1145        Discharge Planning    Assessment Type Discharge Planning Brief Assessment     Resource/Environmental Concerns none     Support Systems Parent     Equipment Currently Used at Home none     Current Living Arrangements home/apartment/condo     Patient/Family Anticipates Transition to home with family     Patient/Family Anticipated Services at Transition none     DME Needed Upon Discharge  none     Discharge Plan A Home with family     Discharge Plan B Home with family                   Spoke with patient and Mother at bedside to complete d/c planning assessment. Patient lives with his parents in a single story home with no steps to enter. No d/c needs noted. Will d/c home when meeting post-op milestones.

## 2022-08-26 NOTE — PLAN OF CARE
Problem: Adult Inpatient Plan of Care  Goal: Plan of Care Review  Outcome: Adequate for Care Transition  Goal: Absence of Hospital-Acquired Illness or Injury  Outcome: Adequate for Care Transition  Goal: Optimal Comfort and Wellbeing  Outcome: Adequate for Care Transition  Goal: Readiness for Transition of Care  Outcome: Adequate for Care Transition

## 2022-08-26 NOTE — PROGRESS NOTES
[facility] - [facility]  General Surgery  Progress Note    Subjective:       Post-Op Info:  Procedure(s) (LRB):  MYOTOMY, PERORAL, ENDOSCOPIC POEM (N/A)   1 Day Post-Op     Interval History: NAEON. Mild abdominal pain. Mild reflux. Awaiting upper GI today    Medications:  Continuous Infusions:   lactated ringers 125 mL/hr at 08/26/22 0004     Scheduled Meds:   enoxaparin  40 mg Subcutaneous Daily     PRN Meds:HYDROmorphone, ketorolac, ondansetron, prochlorperazine, sodium chloride 0.9%     Review of patient's allergies indicates:  No Known Allergies  Objective:     Vital Signs (Most Recent):  Temp: 98.9 °F (37.2 °C) (08/26/22 0456)  Pulse: 97 (08/26/22 0456)  Resp: 18 (08/26/22 0456)  BP: 123/70 (08/26/22 0456)  SpO2: 95 % (08/26/22 0456) Vital Signs (24h Range):  Temp:  [97.7 °F (36.5 °C)-98.9 °F (37.2 °C)] 98.9 °F (37.2 °C)  Pulse:  [] 97  Resp:  [14-19] 18  SpO2:  [95 %-100 %] 95 %  BP: (121-141)/(69-87) 123/70     Weight: 73.9 kg (163 lb)  Body mass index is 19.33 kg/m².    Intake/Output - Last 3 Shifts         08/24 0700 08/25 0659 08/25 0700 08/26 0659 08/26 0700 08/27 0659    P.O.  0     IV Piggyback  1600     Total Intake(mL/kg)  1600 (21.7)     Net  +1600            Urine Occurrence  2 x             Physical Exam  Vitals and nursing note reviewed.   Cardiovascular:      Rate and Rhythm: Normal rate.   Pulmonary:      Effort: Pulmonary effort is normal. No respiratory distress.   Abdominal:      General: There is no distension.      Tenderness: There is no abdominal tenderness.      Comments: Small incision right upper abdomen covered with bandaid.        Significant Labs:  I have reviewed all pertinent lab results within the past 24 hours.  CBC:   Recent Labs   Lab 08/26/22  0354   WBC 8.36   RBC 4.72   HGB 14.1   HCT 40.2      MCV 85   MCH 29.9   MCHC 35.1     BMP:   Recent Labs   Lab 08/26/22  0354   GLU 88      K 4.5      CO2 25   BUN 7   CREATININE 0.9   CALCIUM 9.3        Significant Diagnostics:  UGI pending.     Assessment/Plan:     GERD (gastroesophageal reflux disease)  POD 1 from POEM for type II achalasia.     -NPO  -IV pain meds for now  -UGI, if okay, advance to clears.         Aamir Lovelace MD  General Surgery  [facility] - Surgery

## 2022-08-26 NOTE — ASSESSMENT & PLAN NOTE
POD 1 from POEM for type II achalasia.     -NPO  -IV pain meds for now  -UGI, if okay, advance to clears.

## 2022-08-26 NOTE — DISCHARGE SUMMARY
[facility]  General Surgery  Discharge Summary      Patient Name: Sincere Way  MRN: [id number]  Admission Date: 8/25/2022  Hospital Length of Stay: 0 days  Discharge Date and Time:  08/26/2022 12:34 PM  Attending Physician: Elio Marte Jr.,*   Discharging Provider: Sergey Malhotra MD  Primary Care Provider: Amanda Richardson MD    HPI:   No notes on file    Procedure(s) (LRB):  MYOTOMY, PERORAL, ENDOSCOPIC POEM (N/A)      Indwelling Lines/Drains at time of discharge:   Lines/Drains/Airways     None               Hospital Course: Please see the preoperative H&P and other available documentation for full details related to history prior to this admission.  Briefly, Sincere Way is a 21 y.o. male who was admitted following scheduled elective surgery for Achalasia. They underwent the following procedures: peroral endoscopic myotomy    Following a complete preoperative discussion of the risks and benefits of surgery with signed informed consent, the patient was taken to the operating room on 8/25/2022 and underwent the above stated procedures. The patient tolerated surgery well and there were no complications. Please see the operative report for full intraoperative findings and details. Postoperatively, the patient did well and was transferred from the PACU to the floor in stable condition where they had a stable and uncomplicated hospital course. UGI performed POD1 revealed no leak. Labs and vital signs remained stable and appropriate throughout course. Diet was advanced as tolerated to CLD and the patient's pain was controlled on oral pain medications without problem. Ambulating without issue. Voiding without issue with adequate urine output. Passing gas and stool. Incision site is clean, dry, and intact.    Currently, the patient is doing well at 1 Day Post-Op and is stable and appropriate for discharge home at this time. Patient will follow up in clinic with Dr. Marte in 2  weeks.        Goals of Care Treatment Preferences:  Code Status: Full Code      Consults:     Significant Diagnostic Studies: Labs:   CMP   Recent Labs   Lab 08/26/22  0354      K 4.5      CO2 25   GLU 88   BUN 7   CREATININE 0.9   CALCIUM 9.3   ANIONGAP 11    and CBC   Recent Labs   Lab 08/26/22  0354   WBC 8.36   HGB 14.1   HCT 40.2          Pending Diagnostic Studies:     None        Final Active Diagnoses:    Diagnosis Date Noted POA    GERD (gastroesophageal reflux disease) [K21.9] 03/29/2022 Yes      Problems Resolved During this Admission:      Discharged Condition: stable    Disposition: Home or Self Care    Follow Up:   Follow-up Information     Elio Marte Jr, MD Follow up in 2 week(s).    Specialties: General Surgery, Bariatrics  Why: post op POEM  Contact information:  [address]  [facility] [address]  [phone]                       Patient Instructions:      Diet full liquid     Notify your health care provider if you experience any of the following:  increased confusion or weakness     Notify your health care provider if you experience any of the following:  worsening rash     Notify your health care provider if you experience any of the following:  severe persistent headache     Notify your health care provider if you experience any of the following:  difficulty breathing or increased cough     Notify your health care provider if you experience any of the following:  redness, tenderness, or signs of infection (pain, swelling, redness, odor or green/yellow discharge around incision site)     Notify your health care provider if you experience any of the following:  severe uncontrolled pain     Notify your health care provider if you experience any of the following:  temperature >100.4     Notify your health care provider if you experience any of the following:  persistent dizziness, light-headedness, or visual disturbances     Activity as tolerated   Order  Comments: You had a peroral endoscopic myotomy performed.     You have no activity restrictions and may return to work when you feel ready.     You may alternate tylenol and ibuprofen for pain.     You are on a full liquid diet for the next 1 week. Following this, you may eat soft foods for the next week until you are seen in clinic for your follow up apt.     Please follow up in clinic with Dr. Marte in 2 weeks.     Medications:  Reconciled Home Medications:      Medication List      CONTINUE taking these medications    citalopram 10 MG tablet  Commonly known as: CeleXA  Take 10 mg by mouth once daily.     esomeprazole 20 MG capsule  Commonly known as: NEXIUM  Take 20 mg by mouth 2 (two) times daily.     sucralfate 1 gram tablet  Commonly known as: CARAFATE  Take 1 g by mouth 4 (four) times daily.          Time spent on the discharge of patient: 15 minutes    Sergey Malhotra MD  General Surgery  [facility] - Surgery

## 2022-08-26 NOTE — SUBJECTIVE & OBJECTIVE
Interval History: NAEON. Mild abdominal pain. Mild reflux. Awaiting upper GI today    Medications:  Continuous Infusions:   lactated ringers 125 mL/hr at 08/26/22 0004     Scheduled Meds:   enoxaparin  40 mg Subcutaneous Daily     PRN Meds:HYDROmorphone, ketorolac, ondansetron, prochlorperazine, sodium chloride 0.9%     Review of patient's allergies indicates:  No Known Allergies  Objective:     Vital Signs (Most Recent):  Temp: 98.9 °F (37.2 °C) (08/26/22 0456)  Pulse: 97 (08/26/22 0456)  Resp: 18 (08/26/22 0456)  BP: 123/70 (08/26/22 0456)  SpO2: 95 % (08/26/22 0456) Vital Signs (24h Range):  Temp:  [97.7 °F (36.5 °C)-98.9 °F (37.2 °C)] 98.9 °F (37.2 °C)  Pulse:  [] 97  Resp:  [14-19] 18  SpO2:  [95 %-100 %] 95 %  BP: (121-141)/(69-87) 123/70     Weight: 73.9 kg (163 lb)  Body mass index is 19.33 kg/m².    Intake/Output - Last 3 Shifts         08/24 0700 08/25 0659 08/25 0700 08/26 0659 08/26 0700 08/27 0659    P.O.  0     IV Piggyback  1600     Total Intake(mL/kg)  1600 (21.7)     Net  +1600            Urine Occurrence  2 x             Physical Exam  Vitals and nursing note reviewed.   Cardiovascular:      Rate and Rhythm: Normal rate.   Pulmonary:      Effort: Pulmonary effort is normal. No respiratory distress.   Abdominal:      General: There is no distension.      Tenderness: There is no abdominal tenderness.      Comments: Small incision right upper abdomen covered with bandaid.        Significant Labs:  I have reviewed all pertinent lab results within the past 24 hours.  CBC:   Recent Labs   Lab 08/26/22  0354   WBC 8.36   RBC 4.72   HGB 14.1   HCT 40.2      MCV 85   MCH 29.9   MCHC 35.1     BMP:   Recent Labs   Lab 08/26/22  0354   GLU 88      K 4.5      CO2 25   BUN 7   CREATININE 0.9   CALCIUM 9.3       Significant Diagnostics:  UGI pending.

## 2022-08-29 ENCOUNTER — TELEPHONE (OUTPATIENT)
Dept: SURGERY | Facility: CLINIC | Age: 22
End: 2022-08-29
Payer: COMMERCIAL

## 2022-08-29 NOTE — TELEPHONE ENCOUNTER
----- Message from Lane Ford sent at 8/29/2022 12:43 PM CDT -----  Contact: @[phone]  Caller mom is calling in to get a refill for (pantoprazole (PROTONIX) 40 MG tablet), please call to discuss further.      [facility]/pharmacy #[id number] - [place] - [address]  [address]  [place] [address]  Phone: [phone] Fax: [phone]

## 2022-09-12 ENCOUNTER — OFFICE VISIT (OUTPATIENT)
Dept: SURGERY | Facility: CLINIC | Age: 22
End: 2022-09-12
Payer: COMMERCIAL

## 2022-09-12 VITALS
BODY MASS INDEX: 19.25 KG/M2 | HEART RATE: 95 BPM | WEIGHT: 163 LBS | DIASTOLIC BLOOD PRESSURE: 78 MMHG | SYSTOLIC BLOOD PRESSURE: 126 MMHG | HEIGHT: 77 IN

## 2022-09-12 DIAGNOSIS — Z09 POSTOP CHECK: Primary | ICD-10-CM

## 2022-09-12 PROCEDURE — 99999 PR PBB SHADOW E&M-EST. PATIENT-LVL III: CPT | Mod: PBBFAC,,, | Performed by: SURGERY

## 2022-09-12 PROCEDURE — 3008F PR BODY MASS INDEX (BMI) DOCUMENTED: ICD-10-PCS | Mod: CPTII,S$GLB,, | Performed by: SURGERY

## 2022-09-12 PROCEDURE — 1160F RVW MEDS BY RX/DR IN RCRD: CPT | Mod: CPTII,S$GLB,, | Performed by: SURGERY

## 2022-09-12 PROCEDURE — 1160F PR REVIEW ALL MEDS BY PRESCRIBER/CLIN PHARMACIST DOCUMENTED: ICD-10-PCS | Mod: CPTII,S$GLB,, | Performed by: SURGERY

## 2022-09-12 PROCEDURE — 1159F PR MEDICATION LIST DOCUMENTED IN MEDICAL RECORD: ICD-10-PCS | Mod: CPTII,S$GLB,, | Performed by: SURGERY

## 2022-09-12 PROCEDURE — 3078F DIAST BP <80 MM HG: CPT | Mod: CPTII,S$GLB,, | Performed by: SURGERY

## 2022-09-12 PROCEDURE — 99024 PR POST-OP FOLLOW-UP VISIT: ICD-10-PCS | Mod: S$GLB,,, | Performed by: SURGERY

## 2022-09-12 PROCEDURE — 99024 POSTOP FOLLOW-UP VISIT: CPT | Mod: S$GLB,,, | Performed by: SURGERY

## 2022-09-12 PROCEDURE — 3008F BODY MASS INDEX DOCD: CPT | Mod: CPTII,S$GLB,, | Performed by: SURGERY

## 2022-09-12 PROCEDURE — 3074F PR MOST RECENT SYSTOLIC BLOOD PRESSURE < 130 MM HG: ICD-10-PCS | Mod: CPTII,S$GLB,, | Performed by: SURGERY

## 2022-09-12 PROCEDURE — 99999 PR PBB SHADOW E&M-EST. PATIENT-LVL III: ICD-10-PCS | Mod: PBBFAC,,, | Performed by: SURGERY

## 2022-09-12 PROCEDURE — 1159F MED LIST DOCD IN RCRD: CPT | Mod: CPTII,S$GLB,, | Performed by: SURGERY

## 2022-09-12 PROCEDURE — 3078F PR MOST RECENT DIASTOLIC BLOOD PRESSURE < 80 MM HG: ICD-10-PCS | Mod: CPTII,S$GLB,, | Performed by: SURGERY

## 2022-09-12 PROCEDURE — 3074F SYST BP LT 130 MM HG: CPT | Mod: CPTII,S$GLB,, | Performed by: SURGERY

## 2022-09-12 RX ORDER — PANTOPRAZOLE SODIUM 40 MG/1
40 TABLET, DELAYED RELEASE ORAL DAILY
COMMUNITY
Start: 2022-08-29 | End: 2023-01-31

## 2022-09-12 NOTE — PROGRESS NOTES
Subjective:       Sincere Way presents to the clinic accompanied with his mother, 2 weeks following peroral endoscopic myotomy (POEM). Eating a regular diet without difficulty. Bowel movements are Normal.  The patient is not having any pain.. Pt reports of mild heartburn and states he is using pantoprazole 40 MG daily and TUMS as needed with improvement. He states that he is also using a wedge to elevate the head of his bed which has been helping with his symptoms. He reports that his GERD overall has improved since the procedure. Denies any recent nausea or vomiting. He has been eating smaller meals and is tolerating food well.      Objective:      There were no vitals taken for this visit.    General:  alert, appears stated age, and cooperative   Abdomen: soft, bowel sounds active, non-tender           Assessment:      Doing well postoperatively.      Plan:      1. Continue any current medications.  2. Using wedge to elevate head of bed.   3. Pt is to increase activities as tolerated.  4. Follow up as needed. Patient to follow up with GI in 6 months.     I have personally taken the history and examined this patient and agree with the resident's note as stated above.         Elio Marte MD

## 2022-12-06 ENCOUNTER — TELEPHONE (OUTPATIENT)
Dept: SURGERY | Facility: CLINIC | Age: 22
End: 2022-12-06
Payer: COMMERCIAL

## 2022-12-06 NOTE — TELEPHONE ENCOUNTER
Received a phone message from the Patient's Mother -Sonia 12-5 asking if Dr. Marte would be able to refill his Citalopram and Pantoprazole that were prescribed by another MD.  Mother stated that he is running low.  I called back this morning and left a message instructing her to request a refill from [facility] and [facility] should get in touch with the ordering MD for refills.  Instructed her to call back if she runs into any problems.

## 2023-01-30 ENCOUNTER — PATIENT MESSAGE (OUTPATIENT)
Dept: GASTROENTEROLOGY | Facility: CLINIC | Age: 23
End: 2023-01-30
Payer: COMMERCIAL

## 2023-01-31 ENCOUNTER — TELEPHONE (OUTPATIENT)
Dept: GASTROENTEROLOGY | Facility: CLINIC | Age: 23
End: 2023-01-31

## 2023-01-31 ENCOUNTER — OFFICE VISIT (OUTPATIENT)
Dept: GASTROENTEROLOGY | Facility: CLINIC | Age: 23
End: 2023-01-31
Payer: COMMERCIAL

## 2023-01-31 ENCOUNTER — TELEPHONE (OUTPATIENT)
Dept: GASTROENTEROLOGY | Facility: CLINIC | Age: 23
End: 2023-01-31
Payer: COMMERCIAL

## 2023-01-31 ENCOUNTER — PATIENT MESSAGE (OUTPATIENT)
Dept: GASTROENTEROLOGY | Facility: CLINIC | Age: 23
End: 2023-01-31

## 2023-01-31 DIAGNOSIS — R07.89 NON-CARDIAC CHEST PAIN: ICD-10-CM

## 2023-01-31 DIAGNOSIS — K22.0 ACHALASIA: ICD-10-CM

## 2023-01-31 DIAGNOSIS — K21.9 GASTROESOPHAGEAL REFLUX DISEASE, UNSPECIFIED WHETHER ESOPHAGITIS PRESENT: ICD-10-CM

## 2023-01-31 DIAGNOSIS — R13.10 DYSPHAGIA, UNSPECIFIED TYPE: Primary | ICD-10-CM

## 2023-01-31 PROCEDURE — 99213 PR OFFICE/OUTPT VISIT, EST, LEVL III, 20-29 MIN: ICD-10-PCS | Mod: 95,,, | Performed by: INTERNAL MEDICINE

## 2023-01-31 PROCEDURE — 99213 OFFICE O/P EST LOW 20 MIN: CPT | Mod: 95,,, | Performed by: INTERNAL MEDICINE

## 2023-01-31 RX ORDER — ESOMEPRAZOLE MAGNESIUM 40 MG/1
40 CAPSULE, DELAYED RELEASE ORAL DAILY
Qty: 90 CAPSULE | Refills: 3 | Status: SHIPPED | OUTPATIENT
Start: 2023-01-31 | End: 2023-05-01

## 2023-01-31 NOTE — PROGRESS NOTES
The patient location is: home  The chief complaint leading to consultation is: achalasia    Visit type: audiovisual    Face to Face time with patient: 18  21 minutes of total time spent on the encounter, which includes face to face time and non-face to face time preparing to see the patient (eg, review of tests), Obtaining and/or reviewing separately obtained history, Documenting clinical information in the electronic or other health record, Independently interpreting results (not separately reported) and communicating results to the patient/family/caregiver, or Care coordination (not separately reported).         Each patient to whom he or she provides medical services by telemedicine is:  (1) informed of the relationship between the physician and patient and the respective role of any other health care provider with respect to management of the patient; and (2) notified that he or she may decline to receive medical services by telemedicine and may withdraw from such care at any time.    Notes:        [facility] Consultation Note    Reason for Consult:    No chief complaint on file.        PCP:   Amanda Richardson       Referring MD:  Cachorro Swenson Md  [address] [place] [address]    SURG: Dr. Marte       HPI:  Sincere Way is a 22 y.o. male referred to motility clinic for second opinion regarding the following problems:    8/25/2022: Poem    GERD.    Retrosternal pyrosis: few per month   Regurgitation: rare   Nocturnal:     Caffeine intake:  Sleeps with head of the bed elevated. sometimes   Avoids eating prior to bedtime.  yes    MEDs:  Pantoprazole 40mg QHS  Nexium 20mg QHS    Dysphagia.  With every meal          Regurgitation: no    Chest pain with and without eating. Occasional     Eckardt Symptom Score  Dysphagia: 3  Regurgitation: 0  Retrosternal pain: 0  Weight Loss: 0  Total: 3    Onset: after COVID 2020    Abd pain.  IBS-D  Improved w  celexa     Weight loss Stable   187lb from Now 178lb      Anxiety (abd pain w stress)  Not aware of anxiety   On celexa per PCP     Insomnia.     Denies nausea, early satiety, abdominal pain, bloating, diarrhea, constipation, BRBPR, melena    Previous Studies:   Upper GI 08/26/2022:  No evidence of leak  EGD 05/23/2022 normal esophagus. Possible mild candidiasis (-).  Fluid in the esophagus.  Intermittent puckering, resistance no pop at GE junction.  Z-line regular.  Normal stomach (-).  Normal duodenum.  Flip with borderline GE junction outflow obstruction absent contractility.  Manometry 05/05/2022:  Achalasia type 2. Normal LES pressure with incomplete relaxation and pan esophageal pressurization.  Incomplete bolus clearance.  Evidence of residual liquid at the end of 200 cc bolus.  No evidence of rumination.  Esophagram 02/14/2022:  Significant narrowing of distal esophagus at the level of the GE junction with lack of passage of barium tablet concerning for stricture.  Abnormal thickening of the walls of the stomach with similar appearance of thickening of the esophagus concerning for gastrin esophagitis.  Questionable ulceration middle to distal esophagus.  Initial images suggestive of possible retention of fluid within the mid and distal esophagus  EGD 09/21/2021:  Normal stomach normal duodenum normal upper 3rd of esophagus (-).  Stenosis at GE junction.  Dilation 54 Persian.  HIDA 08/24/2021:  There is no evidence of acute cholecystitis.  Normal gallbladder ejection fraction.  Ultrasound gallbladder 7/28/2021:2 small gallbladder wall polyps measuring 5 mm and 6 mm.   CT abdomen 06/17/2021:  There is a small cyst of the anterior left liver lobe and there is a punctate cyst of the inferior right liver lobe.  No abnormality of gallbladder, pancreas or spleen.  No evidence of acute inflammation in the abdomen.  No free fluid or free air.  No abnormality of the adrenals or kidneys.  Clips in the right lower  quadrant from the appendectomy.    Upper GI series 05/31/2021:  Unremarkable.  Esophagus appears normal in caliber and course and motility.  There is no reflux.  No hiatal hernia.  Normal stomach normal duodenum normal jejunum.    Relevant Surgical History:    8/25/2022: Poem    ROS:  ROS     Complete ROS negative except as above    Medical History:   Past Medical History:   Diagnosis Date    Anxiety disorder, unspecified     COVID-19 11/2020    Dysphagia     GERD (gastroesophageal reflux disease)         Surgical History:   Past Surgical History:   Procedure Laterality Date    APPENDECTOMY      ESOPHAGEAL MANOMETRY WITH MEASUREMENT OF IMPEDANCE N/A 4/29/2022    Procedure: MANOMETRY-ESOPHAGEAL-WITH IMPEDANCE;  Surgeon: Winter Solis MD;  Location: [facility] (4TH FLR);  Service: Endoscopy;  Laterality: N/A;  r/o rumination  fully vaccinated, instructions sent to [facility] and emailed to [email or web address]    ESOPHAGOGASTRODUODENOSCOPY N/A 5/23/2022    Procedure: ESOPHAGOGASTRODUODENOSCOPY (EGD);  Surgeon: Winter Solis MD;  Location: [facility] (2ND FLR);  Service: Endoscopy;  Laterality: N/A;  3 days full liquids 1 day clears-cardiac clearance see te 5/4/22-vacc-inst email-tb-ok to use this spot per prevot-    MYOTOMY, PERORAL, ENDOSCOPIC N/A 8/25/2022    Procedure: MYOTOMY, PERORAL, ENDOSCOPIC POEM;  Surgeon: Elio Marte Jr., MD;  Location: [facility] OR 2ND FLR;  Service: General;  Laterality: N/A;    WISDOM TOOTH EXTRACTION          Family History:   Family History   Problem Relation Age of Onset    Hypertension Mother     Diabetes Father     Hyperlipidemia Father     Pancreatic cancer Paternal Uncle     Pancreatitis Maternal Grandmother     Hiatal hernia Maternal Grandmother     Lung cancer Maternal Grandfather     Breast cancer Paternal Grandmother     Colon polyps Paternal Grandfather     Diabetes Paternal Grandfather     Parkinsonism Paternal Grandfather     Celiac disease Neg Hx     Colon  cancer Neg Hx     Crohn's disease Neg Hx     Esophageal cancer Neg Hx     Inflammatory bowel disease Neg Hx     Irritable bowel syndrome Neg Hx     Liver cancer Neg Hx     Rectal cancer Neg Hx     Stomach cancer Neg Hx     Ulcerative colitis Neg Hx         Social History:   Social History     Socioeconomic History    Marital status: Single   Tobacco Use    Smoking status: Never    Smokeless tobacco: Never   Substance and Sexual Activity    Alcohol use: No    Drug use: No        Review of patient's allergies indicates:  No Known Allergies    Current Outpatient Medications   Medication Sig Dispense Refill    citalopram (CELEXA) 10 MG tablet Take 10 mg by mouth once daily.      esomeprazole (NEXIUM) 40 MG capsule Take 1 capsule (40 mg total) by mouth once daily. 90 capsule 3     No current facility-administered medications for this visit.        Objective Findings:  Vital Signs:  There were no vitals taken for this visit.  There is no height or weight on file to calculate BMI.    Physical Exam:  Physical Exam:limited due to video visit  General appearance: alert, cooperative, no distress  HENT: Normocephalic, atraumatic, neck symmetrical, no nasal discharge  Eyes: conjunctivae/corneas clear,  EOM's intact  Extremities: visible extremities symmetric; no clubbing, cyanosis, or edema  Integument: visible Skin color, texture, turgor normal; no rashes; hair distrubution normal  Neurologic: Alert and oriented X 3,  Psychiatric: no pressured speech; normal affect; no evidence of impaired cognition    Labs:   Reviewed in Epic/record      Assessment and Plan:  Sincere Way is a 22 y.o. male with referred to Esophageal [facility]ility Clinic for 2nd opinion regarding following problems:    GERD   -Stop protonix  -Start nexium 40mg daily   -Gaviscon prn      Dysphagia to solids and liquids.    Regurgitation of white foamy liquid  Chest pain/pyrosis  Achalasia Type II   S/p POEM 8/25/2023  Eckard 3/12 from 5/12  -Discuss risk of  malignancy and importance of alerting me if develops change in symptoms   -EGD w FLIP    Abd pain.  IBS-D  Resolved w celexa   -Def to primary GI     Weight loss.  Resolved     Gb polyps   -Def to primary GI     Anxiety. Not aware of feeling anxious. Sx: abd pain w stress  On celexa per PCP   Never seen a counselor     Follow up in about 6 months (around 7/31/2023).    1. Dysphagia, unspecified type    2. Gastroesophageal reflux disease, unspecified whether esophagitis present    3. Non-cardiac chest pain    4. Achalasia            Order summary:  Orders Placed This Encounter    esomeprazole (NEXIUM) 40 MG capsule       Discussed with PT that I act as a consult service and do not accept patients to be their primary GI provider. Discussed that the goal of our visits is to address relevant motility problems while deferring other GI problems as well as screening and surveillance to his/her primary GI provider.   Discussed that he/she needs to continue to follow with his local primary GI provider.  Discussed that we will complete his/her workup, clarify diagnosis and attempt to optimize his/her symptoms with intention of him/her returning to referring GI provider for long term GI care.   Pt verbalized understanding.        Thank you so much for allowing me to participate in the care of Sincere Solis MD      This note was created using voice recognition software, and may contain some unrecognized transcriptional errors.

## 2023-01-31 NOTE — TELEPHONE ENCOUNTER
MOTILITY CLINIC PROCEDURE ORDERS    CLEARANCE FOR PROCEDURES:  [x] Not needed   [] Cardiology   [] Pulmonary  [] PCP    PROCEDURES  [] EGD   [] Colonoscopy   [x] EGD with EndoFlip   [] Esophageal manometry with impedance - dysphagia   [] Esophageal manometry with impedance - rumination  [] Esophageal manometry with impedance - belching   [] EGD with BRAVO 48 hours   [] EGD with BRAVO 96 hours   [] pH Impedance 24 hours   [] Anorectal manometry   [] Rectal Ultrasound     Does manometry need to be placed endoscopically:   [] Yes    FLOOR:  [] 4th Floor   [x] 2nd Floor    Reason for 2nd Floor:   [] Gastroparesis   [x] End stage achalasia  [] Pre lung transplant   [] Pre hear transplant   [] Pulmonary HTN (PAP>50 or on meds)   [] Severe pulmonary Disease   [] Complex medical problems   [] BMI>50  [] History of anesthesia issues  [] Other:    PREP  [] Standard Prep  [] Severe Constipation Prep (Low residue diet 7 days.  1.5 prep the day prior, 0.5 prep the day of procedure)  [] Full liquid diet 5 days   [] Full liquid diet 3 days   [x] Full liquid diet 2 days   [] Full liquid diet 1 day   [] Other:     MEDICATIONS    pH Studies  [] ON PPI/H2 Blocker   [] OFF PPI/H2 Blocker     Metal allergy (stainless steal w chromium, nickel, copper, cobalt and iron).:   []  Yes    Pacemaker/defibrillator:  [] Yes    Motility Studies (esophageal manometry/anorectal manometry)  Hold narcotics x 1 days if able   Hold TCA x 1 days if able  Propofol/lidocaine only during sedation.  Discuss with Dr. Solis if additional sedation needed.   Hold baclofen for thee days (at least one day) if able   Hold muscle relaxants x 1 day if able     Anticoagulation/antiplt agents:   []  Yes    ORDER OF TESTING:  Day 1: EGD/FLIP       [] No special requirements - pt lives locally     SCHEDULING PRIORITY  [] Urgent  (<7 days)  [] Lung Transplant Workup  [] Priority (<30 days)  [] Routine 1 (schedule ASAP)  [] Routine 2 (next available, < 3 months)   []  Routine 3 (ok if > 3 months)      URGENCY   [] Medically Urgent   [x] Medically NOT Urgent      DIAGNOSIS   [x] Dysphagia   [] EoE  [] GERD   [] Nausea  [] Nausea/vomiting   [] Abd pain   [] Weight loss  [] Diarrhea  [] Melena  [] Hematochezia    [] CRC screening   [] Colon polyps  [] Other:       PHYSICIAN  []  Ok with Dr. Gutierrez   []  Ok with any GI MD

## 2023-01-31 NOTE — PATIENT INSTRUCTIONS
These lifestyle modifications may help with acid reflux at night   -Elevate the head of the bed.  This can be achieved by purchasing an adjustable bed frame or by putting six- to eight-inch blocks under the legs at the head of the bed or a styrofoam wedge under the mattress.   -Refrain from laying down after meals  -Avoid meals two to three hours before bedtime  -Change nexium 40mg daily, 30 min before breakfast   -Stop pantoprazole     -Try gaviscon with alginate.  Chew 1-2 tablets after meals and at bedtime as needed (up to 4x a day).  For best results follow by a half glass of water or other liquid.   Gaviscon is available in liquid formulation.  Take 1-4 tablespoons 4x a day for Regular Strength and 1-4 teaspoonfuls 4x a day for Extra Strength.  The special european formulation with alginate appears more effective and is usually available on [employer].  You can try regular Gaviscon if you are unable to find the one with alginate      -Decrease caffeine intake     -Complete EGD with EndoFlip  EGD is an endoscopic procedure that allows your doctor to examine your esophagus, stomach and duodenum (part of your small intestine). EGD is an outpatient procedure, meaning you can go home that same day. It takes approximately 30 to 60 minutes to perform.  EndoFLIP is a technology that simultaneously measures the area across the inside of a gastrointestinal organ (for example, the esophagus) and the pressure inside that organ. The ratio of the two measurements is called distensibility (stiffness).

## 2023-02-01 ENCOUNTER — TELEPHONE (OUTPATIENT)
Dept: ENDOSCOPY | Facility: HOSPITAL | Age: 23
End: 2023-02-01
Payer: COMMERCIAL

## 2023-02-01 VITALS — WEIGHT: 185 LBS | HEIGHT: 77 IN | BODY MASS INDEX: 21.84 KG/M2

## 2023-02-01 DIAGNOSIS — R13.10 DYSPHAGIA, UNSPECIFIED TYPE: Primary | ICD-10-CM

## 2023-02-01 NOTE — TELEPHONE ENCOUNTER
Contacted patient to schedule procedure. As per our conversation,  patient is scheduled for EGD/Endoflip on 2/22/23 at 3:00 pm. Instructions reviewed with patient and informed instructions will be on patient portal for review.

## 2023-02-20 ENCOUNTER — TELEPHONE (OUTPATIENT)
Dept: ENDOSCOPY | Facility: HOSPITAL | Age: 23
End: 2023-02-20
Payer: COMMERCIAL

## 2023-02-20 NOTE — TELEPHONE ENCOUNTER
----- Message from Prema Valderrama sent at 2/20/2023 10:24 AM CST -----  Regarding: Pt Adv  Contact: [phone]  Pt's mother calling in regards to procedure scheduled on 02/22. Pt's mother requesting call back to see what pt needs to do before procedure.   Please call and adv @[phone]

## 2023-02-20 NOTE — TELEPHONE ENCOUNTER
Spoke with patient's mom/ Patient didn't follow prepping diet and didn't read his instructions.    Informed that patient needs to be rescheduled bc he was to start full liquid diet on 2/19/23.    Patient rescheduled to 2/28/23 at 1:30pm with Dr. Solis    New instructions sent [email or web address]

## 2023-02-28 ENCOUNTER — HOSPITAL ENCOUNTER (OUTPATIENT)
Facility: HOSPITAL | Age: 23
Discharge: HOME | End: 2023-02-28
Attending: INTERNAL MEDICINE | Admitting: INTERNAL MEDICINE
Payer: COMMERCIAL

## 2023-02-28 ENCOUNTER — ANESTHESIA EVENT (OUTPATIENT)
Dept: ENDOSCOPY | Facility: HOSPITAL | Age: 23
End: 2023-02-28
Payer: COMMERCIAL

## 2023-02-28 ENCOUNTER — ANESTHESIA (OUTPATIENT)
Dept: ENDOSCOPY | Facility: HOSPITAL | Age: 23
End: 2023-02-28
Payer: COMMERCIAL

## 2023-02-28 VITALS
WEIGHT: 185 LBS | RESPIRATION RATE: 20 BRPM | SYSTOLIC BLOOD PRESSURE: 119 MMHG | OXYGEN SATURATION: 100 % | TEMPERATURE: 99 F | DIASTOLIC BLOOD PRESSURE: 62 MMHG | HEIGHT: 77 IN | BODY MASS INDEX: 21.84 KG/M2 | HEART RATE: 89 BPM

## 2023-02-28 DIAGNOSIS — K22.0 ACHALASIA: Primary | ICD-10-CM

## 2023-02-28 PROCEDURE — D9220A PRA ANESTHESIA: ICD-10-PCS | Mod: ANES,,, | Performed by: ANESTHESIOLOGY

## 2023-02-28 PROCEDURE — 25000003 PHARM REV CODE 250: Performed by: INTERNAL MEDICINE

## 2023-02-28 PROCEDURE — D9220A PRA ANESTHESIA: Mod: ANES,,, | Performed by: ANESTHESIOLOGY

## 2023-02-28 PROCEDURE — 91040 ESOPH BALLOON DISTENSION TST: CPT | Mod: TC | Performed by: INTERNAL MEDICINE

## 2023-02-28 PROCEDURE — 91040 PR ESOPH BALLOON DISTENSION TST: ICD-10-PCS | Mod: 26,,, | Performed by: INTERNAL MEDICINE

## 2023-02-28 PROCEDURE — D9220A PRA ANESTHESIA: ICD-10-PCS | Mod: CRNA,,, | Performed by: REGISTERED NURSE

## 2023-02-28 PROCEDURE — 43235 PR EGD, FLEX, DIAGNOSTIC: ICD-10-PCS | Mod: ,,, | Performed by: INTERNAL MEDICINE

## 2023-02-28 PROCEDURE — 91040 ESOPH BALLOON DISTENSION TST: CPT | Mod: 26,,, | Performed by: INTERNAL MEDICINE

## 2023-02-28 PROCEDURE — 43235 EGD DIAGNOSTIC BRUSH WASH: CPT | Performed by: INTERNAL MEDICINE

## 2023-02-28 PROCEDURE — 43239 EGD BIOPSY SINGLE/MULTIPLE: CPT | Performed by: INTERNAL MEDICINE

## 2023-02-28 PROCEDURE — D9220A PRA ANESTHESIA: Mod: CRNA,,, | Performed by: REGISTERED NURSE

## 2023-02-28 PROCEDURE — 37000008 HC ANESTHESIA 1ST 15 MINUTES: Performed by: INTERNAL MEDICINE

## 2023-02-28 PROCEDURE — 37000009 HC ANESTHESIA EA ADD 15 MINS: Performed by: INTERNAL MEDICINE

## 2023-02-28 PROCEDURE — 63600175 PHARM REV CODE 636 W HCPCS: Performed by: REGISTERED NURSE

## 2023-02-28 PROCEDURE — 43235 EGD DIAGNOSTIC BRUSH WASH: CPT | Mod: ,,, | Performed by: INTERNAL MEDICINE

## 2023-02-28 PROCEDURE — C1726 CATH, BAL DIL, NON-VASCULAR: HCPCS | Performed by: INTERNAL MEDICINE

## 2023-02-28 RX ORDER — SODIUM CHLORIDE 9 MG/ML
INJECTION, SOLUTION INTRAVENOUS CONTINUOUS
Status: DISCONTINUED | OUTPATIENT
Start: 2023-02-28 | End: 2023-02-28 | Stop reason: HOSPADM

## 2023-02-28 RX ORDER — PROPOFOL 10 MG/ML
VIAL (ML) INTRAVENOUS
Status: DISCONTINUED | OUTPATIENT
Start: 2023-02-28 | End: 2023-02-28

## 2023-02-28 RX ORDER — SODIUM CHLORIDE 0.9 % (FLUSH) 0.9 %
10 SYRINGE (ML) INJECTION
Status: DISCONTINUED | OUTPATIENT
Start: 2023-02-28 | End: 2023-02-28 | Stop reason: HOSPADM

## 2023-02-28 RX ORDER — ONDANSETRON 2 MG/ML
4 INJECTION INTRAMUSCULAR; INTRAVENOUS DAILY PRN
Status: DISCONTINUED | OUTPATIENT
Start: 2023-02-28 | End: 2023-02-28 | Stop reason: HOSPADM

## 2023-02-28 RX ORDER — PROPOFOL 10 MG/ML
VIAL (ML) INTRAVENOUS CONTINUOUS PRN
Status: DISCONTINUED | OUTPATIENT
Start: 2023-02-28 | End: 2023-02-28

## 2023-02-28 RX ADMIN — PROPOFOL 225 MCG/KG/MIN: 10 INJECTION, EMULSION INTRAVENOUS at 01:02

## 2023-02-28 RX ADMIN — SODIUM CHLORIDE: 9 INJECTION, SOLUTION INTRAVENOUS at 01:02

## 2023-02-28 RX ADMIN — Medication 100 MG: at 01:02

## 2023-02-28 NOTE — ANESTHESIA POSTPROCEDURE EVALUATION
Anesthesia Post Evaluation    Patient: Sincere Way    Procedure(s) Performed: Procedure(s) (LRB):  EGD (ESOPHAGOGASTRODUODENOSCOPY) (N/A)    Final Anesthesia Type: general      Patient location during evaluation: PACU  Patient participation: Yes- Able to Participate  Level of consciousness: awake and alert  Post-procedure vital signs: reviewed and stable  Pain management: adequate  Airway patency: patent    PONV status at discharge: No PONV  Anesthetic complications: no      Cardiovascular status: blood pressure returned to baseline  Respiratory status: unassisted  Hydration status: euvolemic  Follow-up not needed.          Vitals Value Taken Time   /62 02/28/23 1432   Temp 37.3 °C (99.1 °F) 02/28/23 1354   Pulse 85 02/28/23 1439   Resp 32 02/28/23 1437   SpO2 93 % 02/28/23 1439   Vitals shown include unvalidated device data.      No case tracking events are documented in the log.      Pain/Kristin Score: Kristin Score: 10 (2/28/2023  2:15 PM)

## 2023-02-28 NOTE — TRANSFER OF CARE
"Anesthesia Transfer of Care Note    Patient: Sincere Way    Procedure(s) Performed: Procedure(s) (LRB):  EGD (ESOPHAGOGASTRODUODENOSCOPY) (N/A)    Patient location: [facility]    Anesthesia Type: general    Transport from OR: Transported from OR on room air with adequate spontaneous ventilation    Post pain: adequate analgesia    Post assessment: no apparent anesthetic complications and tolerated procedure well    Post vital signs: stable    Level of consciousness: responds to stimulation    Nausea/Vomiting: no nausea/vomiting    Complications: none    Transfer of care protocol was followedComments: Nurse at bedside, VSS, spont reg resp noted      Last vitals:   Visit Vitals  /75   Pulse 100   Temp 37.3 °C (99.1 °F) (Temporal)   Resp 18   Ht 6' 5" (1.956 m)   Wt 83.9 kg (185 lb)   SpO2 97%   BMI 21.94 kg/m²     "

## 2023-02-28 NOTE — PROVATION PATIENT INSTRUCTIONS
Discharge Summary/Instructions after an Endoscopic Procedure  Patient Name: Sincere Way  Patient MRN: [id number]  Patient YOB: 2000 Tuesday, February 28, 2023  Winter Solis MD  Dear patient,  As a result of recent federal legislation (The Federal Cures Act), you may   receive lab or pathology results from your procedure in your [facility]   account before your physician is able to contact you. Your physician or   their representative will relay the results to you with their   recommendations at their soonest availability.  Thank you,  RESTRICTIONS:  During your procedure today, you received medications for sedation.  These   medications may affect your judgment, balance and coordination.  Therefore,   for 24 hours, you have the following restrictions:   - DO NOT drive a car, operate machinery, make legal/financial decisions,   sign important papers or drink alcohol.    ACTIVITY:  Today: no heavy lifting, straining or running due to procedural   sedation/anesthesia.  The following day: return to full activity including work.  DIET:  Eat and drink normally unless instructed otherwise.     TREATMENT FOR COMMON SIDE EFFECTS:  - Mild abdominal pain, nausea, belching, bloating or excessive gas:  rest,   eat lightly and use a heating pad.  - Sore Throat: treat with throat lozenges and/or gargle with warm salt   water.  - Because air was used during the procedure, expelling large amounts of air   from your rectum or belching is normal.  - If a bowel prep was taken, you may not have a bowel movement for 1-3 days.    This is normal.  SYMPTOMS TO WATCH FOR AND REPORT TO YOUR PHYSICIAN:  1. Abdominal pain or bloating, other than gas cramps.  2. Chest pain.  3. Back pain.  4. Signs of infection such as: chills or fever occurring within 24 hours   after the procedure.  5. Rectal bleeding, which would show as bright red, maroon, or black stools.   (A tablespoon of blood from the rectum is not serious, especially if    hemorrhoids are present.)  6. Vomiting.  7. Weakness or dizziness.  GO DIRECTLY TO THE NEAREST EMERGENCY ROOM IF YOU HAVE ANY OF THE FOLLOWING:      Difficulty breathing              Chills and/or fever over 101 F   Persistent vomiting and/or vomiting blood   Severe abdominal pain   Severe chest pain   Black, tarry stools   Bleeding- more than one tablespoon   Any other symptom or condition that you feel may need urgent attention  Your doctor recommends these additional instructions:  If any biopsies were taken, your doctors clinic will contact you in 1 to 2   weeks with any results.  - Discharge patient to home (with escort).   - Resume previous diet.   - Continue present medications.   - The findings and recommendations were discussed with the patient.   - Patient has a contact number available for emergencies.  The signs and   symptoms of potential delayed complications were discussed with the   patient.  Return to normal activities tomorrow.  Written discharge   instructions were provided to the patient.  For questions, problems or results please call your physician - Winter Solis MD at Work:  [phone].  [facility], EMERGENCY ROOM PHONE NUMBER: [phone]  IF A COMPLICATION OR EMERGENCY SITUATION ARISES AND YOU ARE UNABLE TO REACH   YOUR PHYSICIAN - GO DIRECTLY TO THE EMERGENCY ROOM.  Winter Solis MD  2/28/2023 1:58:18 PM  This report has been verified and signed electronically.  Dear patient,  As a result of recent federal legislation (The Federal Cures Act), you may   receive lab or pathology results from your procedure in your [facility]   account before your physician is able to contact you. Your physician or   their representative will relay the results to you with their   recommendations at their soonest availability.  Thank you,  PROVATION

## 2023-02-28 NOTE — PLAN OF CARE
Patient states ready for discharge, VSS, no complaints of pain or nausea. A+Ox4. Discharge instructions reviewed.

## 2023-02-28 NOTE — ANESTHESIA PREPROCEDURE EVALUATION
02/28/2023  Sincere Way is a 22 y.o., male here for EGD.    Pre-operative evaluation for Procedure(s) (LRB):  EGD (ESOPHAGOGASTRODUODENOSCOPY) (N/A)        Patient Active Problem List   Diagnosis    GERD (gastroesophageal reflux disease)    Vasovagal near syncope    Esophageal stricture    Anxiety       Review of patient's allergies indicates:  No Known Allergies    No current facility-administered medications on file prior to encounter.     Current Outpatient Medications on File Prior to Encounter   Medication Sig Dispense Refill    citalopram (CELEXA) 10 MG tablet Take 10 mg by mouth once daily.      esomeprazole (NEXIUM) 40 MG capsule Take 1 capsule (40 mg total) by mouth once daily. 90 capsule 3       Past Surgical History:   Procedure Laterality Date    APPENDECTOMY      ESOPHAGEAL MANOMETRY WITH MEASUREMENT OF IMPEDANCE N/A 4/29/2022    Procedure: MANOMETRY-ESOPHAGEAL-WITH IMPEDANCE;  Surgeon: Winter Solis MD;  Location: [facility] (4TH FLR);  Service: Endoscopy;  Laterality: N/A;  r/o rumination  fully vaccinated, instructions sent to [facility] and emailed to [email or web address]    ESOPHAGOGASTRODUODENOSCOPY N/A 5/23/2022    Procedure: ESOPHAGOGASTRODUODENOSCOPY (EGD);  Surgeon: Winter Solis MD;  Location: [facility] (2ND FLR);  Service: Endoscopy;  Laterality: N/A;  3 days full liquids 1 day clears-cardiac clearance see te 5/4/22-vacc-inst email-tb-ok to use this spot per prevot-    MYOTOMY, PERORAL, ENDOSCOPIC N/A 8/25/2022    Procedure: MYOTOMY, PERORAL, ENDOSCOPIC POEM;  Surgeon: Elio Marte Jr., MD;  Location: [facility] OR 2ND FLR;  Service: General;  Laterality: N/A;    WISDOM TOOTH EXTRACTION         Social History     Socioeconomic History    Marital status: Single   Tobacco Use    Smoking status: Never    Smokeless tobacco: Never   Substance and Sexual  Activity    Alcohol use: No    Drug use: No         CBC: No results for input(s): WBC, RBC, HGB, HCT, PLT, MCV, MCH, MCHC in the last 72 hours.    CMP: No results for input(s): NA, K, CL, CO2, BUN, CREATININE, GLU, MG, PHOS, CALCIUM, ALBUMIN, PROT, ALKPHOS, ALT, AST, BILITOT in the last 72 hours.    INR  No results for input(s): PT, INR, PROTIME, APTT in the last 72 hours.            2D Echo:  No results found for this or any previous visit.        Pre-op Assessment    I have reviewed the Patient Summary Reports.     I have reviewed the Nursing Notes.    I have reviewed the Medications.     Review of Systems  Anesthesia Hx:  No problems with previous Anesthesia    Hematology/Oncology:  Hematology Normal   Oncology Normal     EENT/Dental:EENT/Dental Normal   Cardiovascular:  Cardiovascular Normal     Pulmonary:  Pulmonary Normal    Renal/:  Renal/ Normal     Hepatic/GI:   GERD    Musculoskeletal:  Musculoskeletal Normal    Neurological:  Neurology Normal    Endocrine:  Endocrine Normal    Dermatological:  Skin Normal    Psych:   Psychiatric History          Physical Exam  General: Well nourished    Airway:  Mallampati: II   Mouth Opening: Normal  TM Distance: Normal  Tongue: Normal  Neck ROM: Normal ROM    Dental:  Intact    Chest/Lungs:  Clear to auscultation, Normal Respiratory Rate    Heart:  Rate: Normal  Rhythm: Regular Rhythm  Sounds: Normal        Anesthesia Plan  Type of Anesthesia, risks & benefits discussed:    Anesthesia Type: Gen Natural Airway  Intra-op Monitoring Plan: Standard ASA Monitors  Post Op Pain Control Plan: multimodal analgesia  Induction:  IV  Informed Consent: Informed consent signed with the Patient and all parties understand the risks and agree with anesthesia plan.  All questions answered.   ASA Score: 2    Ready For Surgery From Anesthesia Perspective.     .

## 2023-02-28 NOTE — H&P
Short Stay Endoscopy History and Physical    PCP - Amanda Richardson MD     Procedure - EGD/FLPI  ASA - per anesthesia  Mallampati - per anesthesia  History of Anesthesia problems - no  Family history Anesthesia problems -  no   Plan of anesthesia - General    HPI:  This is a 22 y.o. male here for evaluation of achalasia, gerd        Medical History:  has a past medical history of Anxiety disorder, unspecified, COVID-19 (11/2020), Dysphagia, and GERD (gastroesophageal reflux disease).    Surgical History:  has a past surgical history that includes Appendectomy; [place] tooth extraction; Esophageal manometry with measurement of impedance (N/A, 4/29/2022); Esophagogastroduodenoscopy (N/A, 5/23/2022); and myotomy, peroral, endoscopic (N/A, 8/25/2022).    Family History: family history includes Breast cancer in his paternal grandmother; Colon polyps in his paternal grandfather; Diabetes in his father and paternal grandfather; Hiatal hernia in his maternal grandmother; Hyperlipidemia in his father; Hypertension in his mother; Lung cancer in his maternal grandfather; Pancreatic cancer in his paternal uncle; Pancreatitis in his maternal grandmother; Parkinsonism in his paternal grandfather.. Otherwise no colon cancer, inflammatory bowel disease, or GI malignancies.    Social History:  reports that he has never smoked. He has never used smokeless tobacco. He reports that he does not drink alcohol and does not use drugs.    Review of patient's allergies indicates:  No Known Allergies    Medications:   No medications prior to admission.       Physical Exam:    Vital Signs: There were no vitals filed for this visit.    I have explained the risks and benefits of endoscopy procedures to the patient including but not limited to bleeding, perforation, infection, and death.      Winter Solis MD

## 2024-08-06 ENCOUNTER — HOSPITAL ENCOUNTER (EMERGENCY)
Facility: HOSPITAL | Age: 24
Discharge: HOME | End: 2024-08-06
Attending: EMERGENCY MEDICINE
Payer: COMMERCIAL

## 2024-08-06 VITALS
SYSTOLIC BLOOD PRESSURE: 127 MMHG | HEART RATE: 85 BPM | OXYGEN SATURATION: 100 % | WEIGHT: 220 LBS | HEIGHT: 77 IN | TEMPERATURE: 99 F | BODY MASS INDEX: 25.98 KG/M2 | DIASTOLIC BLOOD PRESSURE: 77 MMHG | RESPIRATION RATE: 16 BRPM

## 2024-08-06 DIAGNOSIS — R55 VASOVAGAL ATTACK: ICD-10-CM

## 2024-08-06 DIAGNOSIS — R55 NEAR SYNCOPE: Primary | ICD-10-CM

## 2024-08-06 DIAGNOSIS — R07.9 CHEST PAIN: ICD-10-CM

## 2024-08-06 LAB
OHS QRS DURATION: 86 MS
OHS QTC CALCULATION: 416 MS
POCT GLUCOSE: 96 MG/DL (ref 70–110)

## 2024-08-06 PROCEDURE — 93010 ELECTROCARDIOGRAM REPORT: CPT | Mod: ,,, | Performed by: STUDENT IN AN ORGANIZED HEALTH CARE EDUCATION/TRAINING PROGRAM

## 2024-08-06 PROCEDURE — 82962 GLUCOSE BLOOD TEST: CPT

## 2024-08-06 PROCEDURE — 93005 ELECTROCARDIOGRAM TRACING: CPT

## 2024-08-06 PROCEDURE — 99283 EMERGENCY DEPT VISIT LOW MDM: CPT | Mod: 25

## 2024-08-06 NOTE — ED PROVIDER NOTES
Encounter Date: 8/6/2024       History     Chief Complaint   Patient presents with    Chest Pain     Pt states he suddenly around 0800 had burning pain local to chest, nauseous, dry heaving, generalized weakness and diaphoretic. Pt lied on floor and sx passed. Pt reports mild chest burn in triage but all other sx resolved. Denies LOC or other complaints. Pt denies feeling ill prior to incident.      The history is provided by the patient.     Patient was is a 23-year-old male with a past medical history of GERD, dysphagia and anxiety who presents s/p episode where he felt tired, fatigued, chest pain.  The episode lasted approximately 8 minutes.  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  When the symptoms came out of nowhere.  He notes that he did not eat any food this morning but that has normal for him.  He noted that he started feeling sweaty and it subsequently led him to going down to the break room.  When in the break room, he states he had to lay on the ground because of how uncomfortable he was feeling.  He then subsequently drank some water and states that after that he felt a little bit better.  Here in the emergency department he notes that he still feels out of it but that he is feeling much better.  He notes that he had an episode like this similar a few years ago and subsequently followed up with a cardiologist who performed an echo that was shown to be unremarkable.  Prior to the onset of this episode he states he was in his baseline state of health.  Denies all other symptoms at this time.    Review of patient's allergies indicates:  No Known Allergies  Past Medical History:   Diagnosis Date    Anxiety disorder, unspecified     COVID-19 11/2020    Dysphagia     GERD (gastroesophageal reflux disease)      Past Surgical History:   Procedure Laterality Date    APPENDECTOMY      ESOPHAGEAL MANOMETRY WITH MEASUREMENT OF IMPEDANCE N/A 4/29/2022     Procedure: MANOMETRY-ESOPHAGEAL-WITH IMPEDANCE;  Surgeon: Winter Solis MD;  Location: [facility] (4TH FLR);  Service: Endoscopy;  Laterality: N/A;  r/o rumination  fully vaccinated, instructions sent to [facility] and emailed to [email or web address]    ESOPHAGOGASTRODUODENOSCOPY N/A 5/23/2022    Procedure: ESOPHAGOGASTRODUODENOSCOPY (EGD);  Surgeon: Winter Solis MD;  Location: [facility] (2ND FLR);  Service: Endoscopy;  Laterality: N/A;  3 days full liquids 1 day clears-cardiac clearance see te 5/4/22-vacc-inst email-tb-ok to use this spot per prevot-    ESOPHAGOGASTRODUODENOSCOPY N/A 2/28/2023    Procedure: EGD (ESOPHAGOGASTRODUODENOSCOPY);  Surgeon: Winter Solis MD;  Location: [facility] (2ND FLR);  Service: Endoscopy;  Laterality: N/A;  Endoflip  2nd floor-End stage achalasia  propofol only  full liquid diet x2 days, clear liquid diet x1 day prior to procedure  instructions sent to [facility]  Precall, no answer- [place]  2/20 - pt didnt follow prep diet instructions r/s from 2/22 to 2/    MYOTOMY, PERORAL, ENDOSCOPIC N/A 8/25/2022    Procedure: MYOTOMY, PERORAL, ENDOSCOPIC POEM;  Surgeon: Elio Marte Jr., MD;  Location: [facility] OR 2ND FLR;  Service: General;  Laterality: N/A;    WISDOM TOOTH EXTRACTION       Family History   Problem Relation Name Age of Onset    Hypertension Mother      Diabetes Father      Hyperlipidemia Father      Pancreatic cancer Paternal Uncle great uncle     Pancreatitis Maternal Grandmother      Hiatal hernia Maternal Grandmother      Lung cancer Maternal Grandfather      Breast cancer Paternal Grandmother      Colon polyps Paternal Grandfather      Diabetes Paternal Grandfather      Parkinsonism Paternal Grandfather      Celiac disease Neg Hx      Colon cancer Neg Hx      Crohn's disease Neg Hx      Esophageal cancer Neg Hx      Inflammatory bowel disease Neg Hx      Irritable bowel syndrome Neg Hx      Liver cancer Neg Hx      Rectal cancer Neg Hx      Stomach  cancer Neg Hx      Ulcerative colitis Neg Hx       Social History     Tobacco Use    Smoking status: Never    Smokeless tobacco: Never   Substance Use Topics    Alcohol use: No    Drug use: No     Physical Exam     Initial Vitals [08/06/24 0838]   BP Pulse Resp Temp SpO2   124/81 99 16 98.5 °F (36.9 °C) 100 %      MAP       --         Physical Exam    Nursing note and vitals reviewed.  Constitutional: He appears well-developed. No distress.   HENT:   Head: Normocephalic and atraumatic.   Mouth/Throat: Oropharynx is clear and moist and mucous membranes are normal.   Eyes: EOM are normal. Pupils are equal, round, and reactive to light.   Neck:   Normal range of motion.  Cardiovascular:  Normal rate, regular rhythm and normal heart sounds.           No murmur heard.  Pulmonary/Chest: Breath sounds normal. No respiratory distress. He has no wheezes. He has no rhonchi. He has no rales.   Abdominal: Abdomen is soft. He exhibits no distension. There is no abdominal tenderness. There is no rebound.   Musculoskeletal:         General: No edema.      Cervical back: Normal range of motion.     Neurological: He is alert and oriented to person, place, and time.   Skin: Skin is warm.   Psychiatric: He has a normal mood and affect. His behavior is normal. Thought content normal.         ED Course   Procedures  Labs Reviewed   POCT GLUCOSE       Result Value    POCT Glucose 96     POCT GLUCOSE MONITORING CONTINUOUS     EKG Readings: (Independently Interpreted)   Independently interpreted by MD:  Rate 72, NSR, no stemi, no ectopy, no hypertrophy, right axis deviation         ECG Results              EKG 12-lead (Chest Pain) Age >30 (In process)        Collection Time Result Time QRS Duration OHS QTC Calculation    08/06/24 08:41:21 08/06/24 08:57:45 86 416                     In process by Interface, Lab In [facility] (08/06/24 08:57:50)                   Narrative:    Test Reason : R07.9,    Vent. Rate : 072 BPM     Atrial Rate : 072  BPM     P-R Int : 132 ms          QRS Dur : 086 ms      QT Int : 380 ms       P-R-T Axes : 054 100 060 degrees     QTc Int : 416 ms    Normal sinus rhythm  Rightward axis  Borderline Abnormal ECG  When compared with ECG of 29-MAR-2022 14:51,  No significant change was found    Referred By: MAYO   SELF           Confirmed By:                                   Imaging Results    None          Medications - No data to display  Medical Decision Making  Patient was is a 23-year-old male who presents s/p episode where he felt diaphoretic, lightheaded, weak and had chest pain.  Differential diagnosis includes but is not limited to vasovagal, orthostatic hypotension, hypoglycemia, dysrhythmia, panic attack.  Upon my initial evaluation of the patient, he had overall reassuring vital signs as well as a reassuring physical exam.  We will obtain a point of care glucose as well as an EKG as well as monitor him here in the emergency department to see if there are any changes/new onset or return of symptoms.  We will also obtain orthostatic vitals.    Glucose was shown to be 96 and his orthostatic vitals were normal.  After re-evaluating the patient, he stated that he felt about the same but was still without the symptoms that he was previously experiencing during the episode that initially prompted him to come to the emergency department.  I gave him a referral to Cardiology and recommended he follow up with them if you continued to have episodes similar to what he had today.  Patient is stable for discharge at this time        Amount and/or Complexity of Data Reviewed  Independent Historian: friend  External Data Reviewed: notes.    Risk  OTC drugs.                                Clinical Impression:  Final diagnoses:  [R07.9] Chest pain  [R55] Vasovagal attack  [R55] Near syncope (Primary)          ED Disposition Condition    Discharge Stable          ED Prescriptions    None       Follow-up Information       Follow up  With Specialties Details Why Contact Info Additional Information    Amanda Richardson MD Pediatrics In 1 week As needed [address] [facility] [address] [phone]       [facility] Cardiology Cardiology   [address]  [address]  [facility] [address] [phone] Please park in [address] or D and enter building by using [place] entrance. Check in at central registration near [facility] in [facility]. Proceed to [address] waiting area once checked in.             Harvinder Altamirano MD  Resident  08/06/24 1038

## 2024-08-06 NOTE — ED NOTES
Pt states he suddenly around 0800 had burning pain local to chest, nauseous, dry heaving, generalized weakness and diaphoretic. Pt lied on floor . Pt denies cp upon assessment. Pt reports episode of nausea but it has passed. Pt denies loc. Pt aaox4

## 2024-08-06 NOTE — DISCHARGE INSTRUCTIONS
You have been given a referral to see a cardiologist.  Please follow up if you feel the need.  Please return to the emergency department if you find that you were experience worsening symptoms/further episodes similar to what you experienced today as this would be an indication for further medical workup

## 2024-08-08 ENCOUNTER — TELEPHONE (OUTPATIENT)
Dept: SURGERY | Facility: CLINIC | Age: 24
End: 2024-08-08
Payer: COMMERCIAL

## 2024-08-08 RX ORDER — FAMOTIDINE 40 MG/1
40 TABLET, FILM COATED ORAL 2 TIMES DAILY
COMMUNITY
Start: 2024-08-04

## 2024-08-12 ENCOUNTER — TELEPHONE (OUTPATIENT)
Dept: SURGERY | Facility: CLINIC | Age: 24
End: 2024-08-12
Payer: COMMERCIAL

## 2024-08-12 ENCOUNTER — TELEPHONE (OUTPATIENT)
Dept: ENDOSCOPY | Facility: HOSPITAL | Age: 24
End: 2024-08-12
Payer: COMMERCIAL

## 2024-08-12 VITALS — BODY MASS INDEX: 27.16 KG/M2 | HEIGHT: 77 IN | WEIGHT: 230 LBS

## 2024-08-12 DIAGNOSIS — K21.9 GASTROESOPHAGEAL REFLUX DISEASE, UNSPECIFIED WHETHER ESOPHAGITIS PRESENT: Primary | ICD-10-CM

## 2024-08-12 NOTE — TELEPHONE ENCOUNTER
"----- Message from Mayra Carrillo RN sent at 8/12/2024  9:28 AM CDT -----  Regarding: Scheduling an EGD  Procedure: (EGD) Endoscopy    Diagnosis: GERD, S/P POEM     Procedure Timing: Within 4 weeks (Urgent)    #If within 4 weeks selected, please mayank as high priority#    #If greater than 12 weeks, please select "5-12 weeks" and delay sending until 3 months prior to requested date#       Additional Scheduling Information: Recent increase in symptoms    Is the patient taking a GLP-1 Agonist and/or blood thinner :no    Have you attached a patient to this message: yes  "

## 2024-08-12 NOTE — TELEPHONE ENCOUNTER
Spoke to Sincere to schedule procedure(s) Upper Endoscopy (EGD)       Physician to perform procedure(s) Dr. EMMA Pozo  Date of Procedure (s) 9/18/24  Arrival Time 1:30 PM  Time of Procedure(s) 2:30 PM   Location of Procedure(s) [address]  Type of Rx Prep sent to patient: Other  Instructions provided to patient via [facility]    Patient was informed on the following information and verbalized understanding. Screening questionnaire reviewed with patient and complete. If procedure requires anesthesia, a responsible adult needs to be present to accompany the patient home, patient cannot drive after receiving anesthesia. Appointment details are tentative, especially check-in time. Patient will receive a prep-op call 7 days prior to confirm check-in time for procedure. If applicable the patient should contact their pharmacy to verify Rx for procedure prep is ready for pick-up. Patient was advised to call the scheduling department at [phone] if pharmacy states no Rx is available. Patient was advised to call the endoscopy scheduling department if any questions or concerns arise.      [facility] Scheduling Department

## 2024-08-12 NOTE — TELEPHONE ENCOUNTER
I spoke to Dr. Marte on 8-9 and reviewed the Patient's symptoms and his request for testing.  EGD and Timed Barium Swallow (TBS) requested by Dr. Marte.  Orders entered 9-12.  TBS scheduled for 9-16.  I called and spoke to the Patient's Mother to let her know that the message has been sent to the Endoscopy Schedulers to schedule the EGD.  She asked about Sincere coming in to see Dr. Marte.  I told her it would probably depend on the results of the testing.  She did not have any other questions at present.

## 2024-09-03 ENCOUNTER — TELEPHONE (OUTPATIENT)
Dept: ENDOSCOPY | Facility: HOSPITAL | Age: 24
End: 2024-09-03
Payer: COMMERCIAL

## 2024-09-03 NOTE — TELEPHONE ENCOUNTER
----- Message from Ebony Pack sent at 9/3/2024  1:50 PM CDT -----    ----- Message -----  From: John Patel MA  Sent: 9/3/2024   1:38 PM CDT  To: [facility] Endoscopy Schedulers    Patient jena alike to reschedule please call patient

## 2024-09-03 NOTE — TELEPHONE ENCOUNTER
Returned patients call to reschedule. Patients mother is trying to reschedule the patients esophagram. Patients mother informed to contact Dr. Marte office to have this rescheduled.

## 2024-09-16 ENCOUNTER — TELEPHONE (OUTPATIENT)
Dept: ENDOSCOPY | Facility: HOSPITAL | Age: 24
End: 2024-09-16
Payer: COMMERCIAL

## 2024-09-19 ENCOUNTER — HOSPITAL ENCOUNTER (OUTPATIENT)
Facility: HOSPITAL | Age: 24
Discharge: HOME | End: 2024-09-19
Attending: INTERNAL MEDICINE | Admitting: INTERNAL MEDICINE
Payer: COMMERCIAL

## 2024-09-19 ENCOUNTER — ANESTHESIA EVENT (OUTPATIENT)
Dept: ENDOSCOPY | Facility: HOSPITAL | Age: 24
End: 2024-09-19
Payer: COMMERCIAL

## 2024-09-19 ENCOUNTER — TELEPHONE (OUTPATIENT)
Dept: SURGERY | Facility: CLINIC | Age: 24
End: 2024-09-19
Payer: COMMERCIAL

## 2024-09-19 ENCOUNTER — ANESTHESIA (OUTPATIENT)
Dept: ENDOSCOPY | Facility: HOSPITAL | Age: 24
End: 2024-09-19
Payer: COMMERCIAL

## 2024-09-19 ENCOUNTER — TELEPHONE (OUTPATIENT)
Dept: GASTROENTEROLOGY | Facility: CLINIC | Age: 24
End: 2024-09-19
Payer: COMMERCIAL

## 2024-09-19 VITALS
WEIGHT: 225 LBS | BODY MASS INDEX: 26.57 KG/M2 | SYSTOLIC BLOOD PRESSURE: 117 MMHG | HEART RATE: 76 BPM | TEMPERATURE: 98 F | DIASTOLIC BLOOD PRESSURE: 69 MMHG | OXYGEN SATURATION: 100 % | RESPIRATION RATE: 18 BRPM | HEIGHT: 77 IN

## 2024-09-19 DIAGNOSIS — K21.9 GERD (GASTROESOPHAGEAL REFLUX DISEASE): ICD-10-CM

## 2024-09-19 PROCEDURE — 43247 EGD REMOVE FOREIGN BODY: CPT | Performed by: INTERNAL MEDICINE

## 2024-09-19 PROCEDURE — 25000003 PHARM REV CODE 250: Performed by: NURSE ANESTHETIST, CERTIFIED REGISTERED

## 2024-09-19 PROCEDURE — 37000009 HC ANESTHESIA EA ADD 15 MINS: Performed by: INTERNAL MEDICINE

## 2024-09-19 PROCEDURE — 63600175 PHARM REV CODE 636 W HCPCS: Performed by: NURSE ANESTHETIST, CERTIFIED REGISTERED

## 2024-09-19 PROCEDURE — 27201042 HC RETRIEVAL NET: Performed by: INTERNAL MEDICINE

## 2024-09-19 PROCEDURE — 27201089 HC SNARE, DISP (ANY): Performed by: INTERNAL MEDICINE

## 2024-09-19 PROCEDURE — 43247 EGD REMOVE FOREIGN BODY: CPT | Mod: ,,, | Performed by: INTERNAL MEDICINE

## 2024-09-19 PROCEDURE — 37000008 HC ANESTHESIA 1ST 15 MINUTES: Performed by: INTERNAL MEDICINE

## 2024-09-19 RX ORDER — SODIUM CHLORIDE 0.9 % (FLUSH) 0.9 %
10 SYRINGE (ML) INJECTION
OUTPATIENT
Start: 2024-09-19

## 2024-09-19 RX ORDER — PROPOFOL 10 MG/ML
VIAL (ML) INTRAVENOUS CONTINUOUS PRN
Status: DISCONTINUED | OUTPATIENT
Start: 2024-09-19 | End: 2024-09-19

## 2024-09-19 RX ORDER — PROPOFOL 10 MG/ML
VIAL (ML) INTRAVENOUS
Status: DISCONTINUED | OUTPATIENT
Start: 2024-09-19 | End: 2024-09-19

## 2024-09-19 RX ORDER — LIDOCAINE HYDROCHLORIDE 20 MG/ML
INJECTION INTRAVENOUS
Status: DISCONTINUED | OUTPATIENT
Start: 2024-09-19 | End: 2024-09-19

## 2024-09-19 RX ORDER — SODIUM CHLORIDE 9 MG/ML
INJECTION, SOLUTION INTRAVENOUS CONTINUOUS
Status: DISCONTINUED | OUTPATIENT
Start: 2024-09-19 | End: 2024-09-19 | Stop reason: HOSPADM

## 2024-09-19 RX ADMIN — SODIUM CHLORIDE: 9 INJECTION, SOLUTION INTRAVENOUS at 01:09

## 2024-09-19 RX ADMIN — PROPOFOL 40 MG: 10 INJECTION, EMULSION INTRAVENOUS at 02:09

## 2024-09-19 RX ADMIN — PROPOFOL 80 MG: 10 INJECTION, EMULSION INTRAVENOUS at 02:09

## 2024-09-19 RX ADMIN — LIDOCAINE HYDROCHLORIDE 80 MG: 20 INJECTION INTRAVENOUS at 02:09

## 2024-09-19 RX ADMIN — PROPOFOL 175 MCG/KG/MIN: 10 INJECTION, EMULSION INTRAVENOUS at 02:09

## 2024-09-19 NOTE — TELEPHONE ENCOUNTER
Received voicemail from pt's mother  Returned called  She wanted to know if test could be done at [facility] tomorrow.  Advised it could not be done tomorrow and that I wasn't even certain [facility] did that test.  She stated that he would keep the appointment in [place].  She is aware of the location.

## 2024-09-19 NOTE — H&P
Short Stay Endoscopy History and Physical    PCP - Amanda Richardson MD     Procedure - EGD  ASA - per anesthesia  Mallampati - per anesthesia  History of Anesthesia problems - no  Family history Anesthesia problems -  no   Plan of anesthesia - General    HPI:  This is a 23 y.o. male here for evaluation of :     gerd, s/p POEM      ROS:  Constitutional: No fevers, chills, No weight loss  CV: No chest pain  Pulm: No cough, No shortness of breath  Ophtho: No vision changes  GI: see HPI  Derm: No rash    Medical History:  has a past medical history of Anxiety disorder, unspecified, COVID-19 (11/2020), Dysphagia, and GERD (gastroesophageal reflux disease).    Surgical History:  has a past surgical history that includes Appendectomy; [place] tooth extraction; Esophageal manometry with measurement of impedance (N/A, 4/29/2022); Esophagogastroduodenoscopy (N/A, 5/23/2022); myotomy, peroral, endoscopic (N/A, 8/25/2022); and Esophagogastroduodenoscopy (N/A, 2/28/2023).    Family History: family history includes Breast cancer in his paternal grandmother; Colon polyps in his paternal grandfather; Diabetes in his father and paternal grandfather; Hiatal hernia in his maternal grandmother; Hyperlipidemia in his father; Hypertension in his mother; Lung cancer in his maternal grandfather; Pancreatic cancer in his paternal uncle; Pancreatitis in his maternal grandmother; Parkinsonism in his paternal grandfather.. Otherwise no colon cancer, inflammatory bowel disease, or GI malignancies.    Social History:  reports that he has never smoked. He has never used smokeless tobacco. He reports that he does not drink alcohol and does not use drugs.    Review of patient's allergies indicates:  No Known Allergies    Medications:   Medications Prior to Admission   Medication Sig Dispense Refill Last Dose    citalopram (CELEXA) 10 MG tablet Take 10 mg by mouth once daily.   9/18/2024    famotidine (PEPCID) 40 MG tablet Take 40 mg by  mouth 2 (two) times daily.   Past Week    esomeprazole (NEXIUM) 40 MG capsule Take 1 capsule (40 mg total) by mouth once daily. 90 capsule 3        Physical Exam:    Vital Signs:   Vitals:    09/19/24 1338   BP: 136/75   Pulse: 78   Resp: 14   Temp: 97.9 °F (36.6 °C)       General Appearance: Well appearing in no acute distress  Eyes:    No scleral icterus  ENT: Neck supple, Lips, mucosa, and tongue normal; teeth and gums normal  Abdomen: Soft, non tender, non distended with normal bowel sounds. No hepatosplenomegaly, ascites, or mass.  Extremities: No edema  Skin: No rash    Labs:  Lab Results   Component Value Date    WBC 8.36 08/26/2022    HGB 14.1 08/26/2022    HCT 40.2 08/26/2022     08/26/2022    ALT 8 (L) 02/26/2014    AST 12 02/26/2014     08/26/2022    K 4.5 08/26/2022     08/26/2022    CREATININE 0.9 08/26/2022    BUN 7 08/26/2022    CO2 25 08/26/2022       I have explained the risks and benefits of endoscopy procedures to the patient including but not limited to bleeding, perforation, infection, and death.  The patient was asked if they understand and allowed to ask any further questions to their satisfaction.      Richa Dumont DO

## 2024-09-19 NOTE — ANESTHESIA PREPROCEDURE EVALUATION
09/19/2024  Sincere Way is a 23 y.o., male here for EGD.    Pre-operative evaluation for Procedure(s) (LRB):  EGD (ESOPHAGOGASTRODUODENOSCOPY) (N/A)        Patient Active Problem List   Diagnosis    GERD (gastroesophageal reflux disease)    Vasovagal near syncope    Esophageal stricture    Anxiety       Review of patient's allergies indicates:  No Known Allergies    No current facility-administered medications on file prior to encounter.     Current Outpatient Medications on File Prior to Encounter   Medication Sig Dispense Refill    citalopram (CELEXA) 10 MG tablet Take 10 mg by mouth once daily.      famotidine (PEPCID) 40 MG tablet Take 40 mg by mouth 2 (two) times daily.      esomeprazole (NEXIUM) 40 MG capsule Take 1 capsule (40 mg total) by mouth once daily. 90 capsule 3       Past Surgical History:   Procedure Laterality Date    APPENDECTOMY      ESOPHAGEAL MANOMETRY WITH MEASUREMENT OF IMPEDANCE N/A 4/29/2022    Procedure: MANOMETRY-ESOPHAGEAL-WITH IMPEDANCE;  Surgeon: Winter Solis MD;  Location: [facility] (4TH FLR);  Service: Endoscopy;  Laterality: N/A;  r/o rumination  fully vaccinated, instructions [facility] to [facility] and emailed to [email or web address]    ESOPHAGOGASTRODUODENOSCOPY N/A 5/23/2022    Procedure: ESOPHAGOGASTRODUODENOSCOPY (EGD);  Surgeon: Winter Solis MD;  Location: [facility] (2ND FLR);  Service: Endoscopy;  Laterality: N/A;  3 days full liquids 1 day clears-cardiac clearance see te 5/4/22-vacc-inst email-tb-ok to use this spot per prevot-    ESOPHAGOGASTRODUODENOSCOPY N/A 2/28/2023    Procedure: EGD (ESOPHAGOGASTRODUODENOSCOPY);  Surgeon: Winter Solis MD;  Location: [facility] (2ND FLR);  Service: Endoscopy;  Laterality: N/A;  Endoflip  2nd floor-End stage achalasia  propofol only  full liquid diet x2 days, clear liquid diet x1 day prior to  "procedure  instructions [facility]  Precall, no answer- [place]  2/20 - pt didnt follow prep diet instructions r/s from 2/22 to 2/    MYOTOMY, PERORAL, ENDOSCOPIC N/A 8/25/2022    Procedure: MYOTOMY, PERORAL, ENDOSCOPIC POEM;  Surgeon: Elio Marte Jr., MD;  Location: [facility] OR [address] [place] [address];  Service: General;  Laterality: N/A;    WISDOM TOOTH EXTRACTION         Social History     Socioeconomic History    Marital status: Single   Tobacco Use    Smoking status: Never    Smokeless tobacco: Never   Substance and Sexual Activity    Alcohol use: No    Drug use: No         CBC: No results for input(s): "WBC", "RBC", "HGB", "HCT", "PLT", "MCV", "MCH", "MCHC" in the last 72 hours.    CMP: No results for input(s): "NA", "K", "CL", "CO2", "BUN", "CREATININE", "GLU", "MG", "PHOS", "CALCIUM", "ALBUMIN", "PROT", "ALKPHOS", "ALT", "AST", "BILITOT" in the last 72 hours.    INR  No results for input(s): "PT", "INR", "PROTIME", "APTT" in the last 72 hours.            2D Echo:  No results found for this or any previous visit.        Pre-op Assessment    I have reviewed the Patient Summary Reports.     I have reviewed the Nursing Notes.    I have reviewed the Medications.     Review of Systems  Anesthesia Hx:  No problems with previous Anesthesia                Hematology/Oncology:  Hematology Normal   Oncology Normal                                   EENT/Dental:  EENT/Dental Normal           Cardiovascular:  Cardiovascular Normal                                            Pulmonary:  Pulmonary Normal                       Renal/:  Renal/ Normal                 Hepatic/GI:     GERD             Musculoskeletal:  Musculoskeletal Normal                Neurological:  Neurology Normal                                      Endocrine:  Endocrine Normal            Dermatological:  Skin Normal        Physical Exam  General: Well nourished    Airway:  Mallampati: II / I  Mouth Opening: Normal  TM Distance: Normal  Tongue: " Normal  Neck ROM: Normal ROM    Dental:  Intact    Chest/Lungs:  Clear to auscultation, Normal Respiratory Rate    Heart:  Rate: Normal  Rhythm: Regular Rhythm  Sounds: Normal      Anesthesia Plan  Type of Anesthesia, risks & benefits discussed:    Anesthesia Type: Gen Natural Airway  Intra-op Monitoring Plan: Standard ASA Monitors  Post Op Pain Control Plan: multimodal analgesia  Induction:  IV  Informed Consent: Informed consent signed with the Patient and all parties understand the risks and agree with anesthesia plan.  All questions answered.   ASA Score: 2  Day of Surgery Review of History & Physical: H&P Update referred to the surgeon/provider.    Ready For Surgery From Anesthesia Perspective.     .

## 2024-09-19 NOTE — PROVATION PATIENT INSTRUCTIONS
Discharge Summary/Instructions after an Endoscopic Procedure  Patient Name: Sincere Way  Patient MRN: [id number]  Patient YOB: 2000 Thursday, September 19, 2024  Mendez Pozo MD  Dear patient,  As a result of recent federal legislation (The Federal Cures Act), you may   receive lab or pathology results from your procedure in your [facility]   account before your physician is able to contact you. Your physician or   their representative will relay the results to you with their   recommendations at their soonest availability.  Thank you,  RESTRICTIONS:  During your procedure today, you received medications for sedation.  These   medications may affect your judgment, balance and coordination.  Therefore,   for 24 hours, you have the following restrictions:   - DO NOT drive a car, operate machinery, make legal/financial decisions,   sign important papers or drink alcohol.    ACTIVITY:  Today: no heavy lifting, straining or running due to procedural   sedation/anesthesia.  The following day: return to full activity including work.  DIET:  Eat and drink normally unless instructed otherwise.     TREATMENT FOR COMMON SIDE EFFECTS:  - Mild abdominal pain, nausea, belching, bloating or excessive gas:  rest,   eat lightly and use a heating pad.  - Sore Throat: treat with throat lozenges and/or gargle with warm salt   water.  - Because air was used during the procedure, expelling large amounts of air   from your rectum or belching is normal.  - If a bowel prep was taken, you may not have a bowel movement for 1-3 days.    This is normal.  SYMPTOMS TO WATCH FOR AND REPORT TO YOUR PHYSICIAN:  1. Abdominal pain or bloating, other than gas cramps.  2. Chest pain.  3. Back pain.  4. Signs of infection such as: chills or fever occurring within 24 hours   after the procedure.  5. Rectal bleeding, which would show as bright red, maroon, or black stools.   (A tablespoon of blood from the rectum is not serious, especially  if   hemorrhoids are present.)  6. Vomiting.  7. Weakness or dizziness.  GO DIRECTLY TO THE NEAREST EMERGENCY ROOM IF YOU HAVE ANY OF THE FOLLOWING:      Difficulty breathing              Chills and/or fever over 101 F   Persistent vomiting and/or vomiting blood   Severe abdominal pain   Severe chest pain   Black, tarry stools   Bleeding- more than one tablespoon   Any other symptom or condition that you feel may need urgent attention  Your doctor recommends these additional instructions:  If any biopsies were taken, your doctors clinic will contact you in 1 to 2   weeks with any results.  - Patient has a contact number available for emergencies.  The signs and   symptoms of potential delayed complications were discussed with the   patient.  Return to normal activities tomorrow.  Written discharge   instructions were provided to the patient.   - Discharge patient to home (ambulatory).   - Resume previous diet.   - Follow an antireflux regimen.   - Continue present medications.   - Perform a gastric emptying study.  - The findings and recommendations were discussed with the patient.   - The findings and recommendations were discussed with the patient's   family.  For questions, problems or results please call your physician - Mendez Pozo MD at Work:  [phone].  [facility], EMERGENCY ROOM PHONE NUMBER: [phone]  IF A COMPLICATION OR EMERGENCY SITUATION ARISES AND YOU ARE UNABLE TO REACH   YOUR PHYSICIAN - GO DIRECTLY TO THE EMERGENCY ROOM.  Mendez Pozo MD  9/19/2024 2:41:44 PM  This report has been verified and signed electronically.  Dear patient,  As a result of recent federal legislation (The Federal Cures Act), you may   receive lab or pathology results from your procedure in your [facility]   account before your physician is able to contact you. Your physician or   their representative will relay the results to you with their   recommendations at their soonest availability.  Thank  you,  PROVATION

## 2024-09-19 NOTE — ANESTHESIA POSTPROCEDURE EVALUATION
Anesthesia Post Evaluation    Patient: Sincere Way    Procedure(s) Performed: Procedure(s) (LRB):  EGD (ESOPHAGOGASTRODUODENOSCOPY) (N/A)    Final Anesthesia Type: general      Patient location during evaluation: [facility]  Patient participation: Yes- Able to Participate  Level of consciousness: awake and alert  Post-procedure vital signs: reviewed and stable  Pain management: adequate  Airway patency: patent    PONV status at discharge: No PONV  Anesthetic complications: no      Cardiovascular status: hemodynamically stable  Respiratory status: unassisted, spontaneous ventilation and room air  Hydration status: euvolemic  Follow-up not needed.          Vitals Value Taken Time   /74 09/19/24 1531   Temp 36.8 °C (98.2 °F) 09/19/24 1515   Pulse 71 09/19/24 1531   Resp 22 09/19/24 1530   SpO2 100 % 09/19/24 1531   Vitals shown include unfiled device data.      No case tracking events are documented in the log.      Pain/Kristin Score: Kristin Score: 10 (9/19/2024  3:00 PM)

## 2024-09-19 NOTE — TRANSFER OF CARE
"Anesthesia Transfer of Care Note    Patient: Sincere Way    Procedure(s) Performed: Procedure(s) (LRB):  EGD (ESOPHAGOGASTRODUODENOSCOPY) (N/A)    Patient location: PACU    Anesthesia Type: general    Transport from OR: Transported from OR on room air with adequate spontaneous ventilation    Post pain: adequate analgesia    Post assessment: no apparent anesthetic complications and tolerated procedure well    Post vital signs: stable    Level of consciousness: awake, alert and oriented    Nausea/Vomiting: no nausea/vomiting    Complications: none    Transfer of care protocol was followed      Last vitals: Visit Vitals  /75 (BP Location: Left arm, Patient Position: Lying)   Pulse 78   Temp 36.6 °C (97.9 °F) (Temporal)   Resp 14   Ht 6' 5" (1.956 m)   Wt 102.1 kg (225 lb)   SpO2 100%   BMI 26.68 kg/m²     "

## 2024-09-19 NOTE — PLAN OF CARE
Pt coughed up white foam substance and reports it happens on and off for the last two years.  He and his mother asked if Dr. Pozo could come to bedside. Secure Message sent to Dr. Pozo and Dr. Dumont

## 2024-09-19 NOTE — TELEPHONE ENCOUNTER
Johana w/pt and Beena w/nuclear medicine regarding scheduling GES ordered by Dr. Pozo. Beena scheduled the pt GES on 10/2/2024 at 8:00 am.   Pt verbalize understand all instructions, confirmed appt and thank me.     ----- Message from Mendez Pozo MD sent at 9/19/2024  3:44 PM CDT -----  Regarding: Gastric emptying study  Patient is post-EGD today.  I ordered a gastric emptying study for him.  Please help schedule.

## 2024-09-20 ENCOUNTER — HOSPITAL ENCOUNTER (OUTPATIENT)
Dept: RADIOLOGY | Facility: HOSPITAL | Age: 24
Discharge: HOME | End: 2024-09-20
Attending: SURGERY
Payer: COMMERCIAL

## 2024-09-20 DIAGNOSIS — K21.9 GASTROESOPHAGEAL REFLUX DISEASE, UNSPECIFIED WHETHER ESOPHAGITIS PRESENT: ICD-10-CM

## 2024-09-20 PROCEDURE — 25500020 PHARM REV CODE 255: Performed by: SURGERY

## 2024-09-20 PROCEDURE — 74220 X-RAY XM ESOPHAGUS 1CNTRST: CPT | Mod: TC

## 2024-09-20 PROCEDURE — 74220 X-RAY XM ESOPHAGUS 1CNTRST: CPT | Mod: 26,,, | Performed by: RADIOLOGY

## 2024-09-20 RX ADMIN — IOHEXOL 150 ML: 350 INJECTION, SOLUTION INTRAVENOUS at 08:09

## 2024-10-02 ENCOUNTER — HOSPITAL ENCOUNTER (OUTPATIENT)
Dept: RADIOLOGY | Facility: HOSPITAL | Age: 24
Discharge: HOME | End: 2024-10-02
Attending: INTERNAL MEDICINE
Payer: COMMERCIAL

## 2024-10-02 DIAGNOSIS — K21.9 GERD (GASTROESOPHAGEAL REFLUX DISEASE): ICD-10-CM

## 2024-10-02 PROCEDURE — 78264 GASTRIC EMPTYING IMG STUDY: CPT | Mod: TC

## 2024-10-02 PROCEDURE — 78264 GASTRIC EMPTYING IMG STUDY: CPT | Mod: 26,,, | Performed by: NUCLEAR MEDICINE

## 2024-10-02 PROCEDURE — A9541 TC99M SULFUR COLLOID: HCPCS | Performed by: INTERNAL MEDICINE

## 2024-10-02 RX ORDER — TECHNETIUM TC 99M SULFUR COLLOID 2 MG
1 KIT MISCELLANEOUS
Status: COMPLETED | OUTPATIENT
Start: 2024-10-02 | End: 2024-10-02

## 2024-10-02 RX ADMIN — TECHNETIUM TC 99M SULFUR COLLOID KIT 1 MILLICURIE: KIT at 08:10

## 2024-10-04 ENCOUNTER — PATIENT MESSAGE (OUTPATIENT)
Dept: SURGERY | Facility: CLINIC | Age: 24
End: 2024-10-04
Payer: COMMERCIAL

## 2024-10-04 ENCOUNTER — TELEPHONE (OUTPATIENT)
Dept: SURGERY | Facility: CLINIC | Age: 24
End: 2024-10-04
Payer: COMMERCIAL

## 2024-10-14 ENCOUNTER — TELEPHONE (OUTPATIENT)
Dept: SURGERY | Facility: CLINIC | Age: 24
End: 2024-10-14
Payer: COMMERCIAL

## 2024-10-14 NOTE — TELEPHONE ENCOUNTER
I called the Patient's Mother, who I have been contacting to schedule Sincere's appontments.  I explained that Brenda sent a [facility] message to Sincere to come in to see Dr. Marte, but it doesn't look like he read the message.  Dr. Marte has reviewed his recent tests and he is asking to see Sincere in the Clinic.  I left 2 direct phone numbers for a return call.

## 2024-10-23 ENCOUNTER — TELEPHONE (OUTPATIENT)
Dept: SURGERY | Facility: CLINIC | Age: 24
End: 2024-10-23
Payer: COMMERCIAL

## 2024-10-23 NOTE — TELEPHONE ENCOUNTER
I received a phone call from the Patient's Mother - Sonia.  She was returning a phone call to schedule an appointment for her Son after completing his test.  Appointment scheduled.  Directions given to the Clinic.

## 2024-10-30 ENCOUNTER — TELEPHONE (OUTPATIENT)
Dept: ENDOSCOPY | Facility: HOSPITAL | Age: 24
End: 2024-10-30
Payer: COMMERCIAL

## 2024-10-30 ENCOUNTER — OFFICE VISIT (OUTPATIENT)
Dept: SURGERY | Facility: CLINIC | Age: 24
End: 2024-10-30
Payer: COMMERCIAL

## 2024-10-30 VITALS
DIASTOLIC BLOOD PRESSURE: 87 MMHG | HEART RATE: 98 BPM | SYSTOLIC BLOOD PRESSURE: 133 MMHG | BODY MASS INDEX: 27.25 KG/M2 | WEIGHT: 230.81 LBS | HEIGHT: 77 IN

## 2024-10-30 DIAGNOSIS — K22.0 ACHALASIA: ICD-10-CM

## 2024-10-30 DIAGNOSIS — R13.10 DYSPHAGIA, UNSPECIFIED TYPE: Primary | ICD-10-CM

## 2024-10-30 PROCEDURE — 99214 OFFICE O/P EST MOD 30 MIN: CPT | Mod: S$GLB,,, | Performed by: SURGERY

## 2024-10-30 PROCEDURE — 99999 PR PBB SHADOW E&M-EST. PATIENT-LVL III: CPT | Mod: PBBFAC,,, | Performed by: SURGERY

## 2024-10-30 PROCEDURE — 1159F MED LIST DOCD IN RCRD: CPT | Mod: CPTII,S$GLB,, | Performed by: SURGERY

## 2024-10-30 PROCEDURE — 3008F BODY MASS INDEX DOCD: CPT | Mod: CPTII,S$GLB,, | Performed by: SURGERY

## 2024-10-30 PROCEDURE — 1160F RVW MEDS BY RX/DR IN RCRD: CPT | Mod: CPTII,S$GLB,, | Performed by: SURGERY

## 2024-10-30 PROCEDURE — 3075F SYST BP GE 130 - 139MM HG: CPT | Mod: CPTII,S$GLB,, | Performed by: SURGERY

## 2024-10-30 PROCEDURE — 3079F DIAST BP 80-89 MM HG: CPT | Mod: CPTII,S$GLB,, | Performed by: SURGERY

## 2024-11-06 ENCOUNTER — TELEPHONE (OUTPATIENT)
Dept: ENDOSCOPY | Facility: HOSPITAL | Age: 24
End: 2024-11-06
Payer: COMMERCIAL

## 2024-11-06 VITALS — WEIGHT: 220 LBS | BODY MASS INDEX: 25.98 KG/M2 | HEIGHT: 77 IN

## 2024-11-06 DIAGNOSIS — K21.9 GASTROESOPHAGEAL REFLUX DISEASE, UNSPECIFIED WHETHER ESOPHAGITIS PRESENT: ICD-10-CM

## 2024-11-06 DIAGNOSIS — K22.0 ACHALASIA: ICD-10-CM

## 2024-11-06 DIAGNOSIS — R13.10 DYSPHAGIA, UNSPECIFIED TYPE: Primary | ICD-10-CM

## 2024-11-06 NOTE — TELEPHONE ENCOUNTER
----- Message from Marita Gutierrez MD sent at 10/31/2024 12:56 PM CDT -----  Regarding: EGD with Endoflip, Dilation and esophageal manometry catheter placement , followed by esophageal manometry  [facility] PROCEDURE ORDERS    CLEARANCE FOR PROCEDURES:  [ ] Not needed       PROCEDURES    [ ] EGD with EndoFlip , Dilation and manometry catheter placement during EGD  [ ] Esophageal manometry with impedance - dysphagia       Does manometry need to be placed endoscopically:   YES    FLOOR:    [ ] 2nd Floor    Reason for 2nd Floor:   [ ] End stage achalasia      PREP  [ ] Full liquid diet 3 days       MEDICATIONS    Motility Studies (esophageal manometry/anorectal manometry)  Hold narcotics x 1 days if able   Hold TCA x 1 days if able  Propofol/lidocaine only during sedation.  Discuss with Dr. Solis if additional sedation needed.   Hold baclofen for thee days (at least one day) if able   Hold muscle relaxants x 1 day if able     Anticoagulation/antiplt agents:   No    ORDER OF TESTING:  Day 1: EGD with Endoflip, esophageal dilation, esophageal manometry catheter placement, followed by esophageal manometry with dysphagia protocol      SCHEDULING PRIORITY  Routine    URGENCY     [x] Medically NOT Urgent      DIAGNOSIS   [ ] Dysphagia , achalasia         PHYSICIAN  [ ]  Ok with Dr. Gutierrez

## 2024-11-06 NOTE — TELEPHONE ENCOUNTER
Referral for procedure from [facility]      Spoke to [place] to schedule procedure(s) Upper Endoscopy (EGD)       Physician to perform procedure(s) Dr. EMMA Gutierrez  Date of Procedure (s) 12/30/24  Arrival Time 7:15 AM  Time of Procedure(s) 8:15 AM   Location of Procedure(s) [address]  Type of Rx Prep sent to patient: Other  Instructions provided to patient via [facility]    Patient was informed on the following information and verbalized understanding. Screening questionnaire reviewed with patient and complete. If procedure requires anesthesia, a responsible adult needs to be present to accompany the patient home, patient cannot drive after receiving anesthesia. Appointment details are tentative, especially check-in time. Patient will receive a prep-op call 7 days prior to confirm check-in time for procedure. If applicable the patient should contact their pharmacy to verify Rx for procedure prep is ready for pick-up. Patient was advised to call the scheduling department at [phone] if pharmacy states no Rx is available. Patient was advised to call the endoscopy scheduling department if any questions or concerns arise.       Endoscopy Scheduling Department

## 2024-11-13 ENCOUNTER — TELEPHONE (OUTPATIENT)
Dept: ENDOSCOPY | Facility: HOSPITAL | Age: 24
End: 2024-11-13
Payer: COMMERCIAL

## 2024-11-13 NOTE — TELEPHONE ENCOUNTER
----- Message from Med Assistant Jaden sent at 11/13/2024  1:18 PM CST -----    ----- Message -----  From: Ebony Pack  Sent: 11/6/2024  10:34 AM CST  To: Jaden Davis MA      ----- Message -----  From: John Patel MA  Sent: 11/6/2024  10:05 AM CST  To: [facility] Schedulers    Patient will like a sooner procedure please call patient silvia Scott [phone]

## 2024-12-27 ENCOUNTER — ANESTHESIA EVENT (OUTPATIENT)
Dept: ENDOSCOPY | Facility: HOSPITAL | Age: 24
End: 2024-12-27
Payer: COMMERCIAL

## 2024-12-27 NOTE — ANESTHESIA PREPROCEDURE EVALUATION
12/27/2024  Sincere Way is a 23 y.o., male.      Pre-op Assessment    I have reviewed the Patient Summary Reports.       I have reviewed the Medications.     Review of Systems  Anesthesia Hx:  No problems with previous Anesthesia               Denies Personal Hx of Anesthesia complications.                    Hepatic/GI:     GERD         Gerd          Psych:  Psychiatric History                  Physical Exam    Airway:  No airway management difficulties anticipated  Dental:  No active dental issues noted  Chest/Lungs:  Clear to auscultation    Heart:  Rate: Normal  Rhythm: Regular Rhythm  Sounds: Normal        Anesthesia Plan  Type of Anesthesia, risks & benefits discussed:    Anesthesia Type: Gen Natural Airway  Informed Consent: Informed consent signed with the Patient and all parties understand the risks and agree with anesthesia plan.  All questions answered.   ASA Score: 3  Anesthesia Plan Notes: Chart reviewed. Patient seen and examined. Anesthesia plan discussed and questions answered. E-consent signed. Lincoln Darby MD    Ready For Surgery From Anesthesia Perspective.     .

## 2024-12-30 ENCOUNTER — HOSPITAL ENCOUNTER (OUTPATIENT)
Facility: HOSPITAL | Age: 24
Discharge: HOME | End: 2024-12-30
Attending: INTERNAL MEDICINE | Admitting: INTERNAL MEDICINE
Payer: COMMERCIAL

## 2024-12-30 ENCOUNTER — ANESTHESIA (OUTPATIENT)
Dept: ENDOSCOPY | Facility: HOSPITAL | Age: 24
End: 2024-12-30
Payer: COMMERCIAL

## 2024-12-30 VITALS
HEIGHT: 77 IN | OXYGEN SATURATION: 98 % | BODY MASS INDEX: 25.98 KG/M2 | WEIGHT: 220 LBS | DIASTOLIC BLOOD PRESSURE: 63 MMHG | HEART RATE: 90 BPM | SYSTOLIC BLOOD PRESSURE: 115 MMHG | RESPIRATION RATE: 20 BRPM | TEMPERATURE: 99 F

## 2024-12-30 DIAGNOSIS — R13.10 DYSPHAGIA: ICD-10-CM

## 2024-12-30 DIAGNOSIS — R13.10 DYSPHAGIA, UNSPECIFIED TYPE: Primary | ICD-10-CM

## 2024-12-30 PROCEDURE — 43239 EGD BIOPSY SINGLE/MULTIPLE: CPT | Mod: 59 | Performed by: INTERNAL MEDICINE

## 2024-12-30 PROCEDURE — 43249 ESOPH EGD DILATION <30 MM: CPT | Mod: ,,, | Performed by: INTERNAL MEDICINE

## 2024-12-30 PROCEDURE — 88312 SPECIAL STAINS GROUP 1: CPT | Performed by: PATHOLOGY

## 2024-12-30 PROCEDURE — 43239 EGD BIOPSY SINGLE/MULTIPLE: CPT | Mod: 59,,, | Performed by: INTERNAL MEDICINE

## 2024-12-30 PROCEDURE — C1726 CATH, BAL DIL, NON-VASCULAR: HCPCS | Performed by: INTERNAL MEDICINE

## 2024-12-30 PROCEDURE — 91010 ESOPHAGUS MOTILITY STUDY: CPT | Mod: TC | Performed by: INTERNAL MEDICINE

## 2024-12-30 PROCEDURE — 37000009 HC ANESTHESIA EA ADD 15 MINS: Performed by: INTERNAL MEDICINE

## 2024-12-30 PROCEDURE — 25000003 PHARM REV CODE 250: Performed by: INTERNAL MEDICINE

## 2024-12-30 PROCEDURE — 91040 ESOPH BALLOON DISTENSION TST: CPT | Performed by: INTERNAL MEDICINE

## 2024-12-30 PROCEDURE — 88305 TISSUE EXAM BY PATHOLOGIST: CPT | Performed by: PATHOLOGY

## 2024-12-30 PROCEDURE — 43249 ESOPH EGD DILATION <30 MM: CPT | Performed by: INTERNAL MEDICINE

## 2024-12-30 PROCEDURE — 37000008 HC ANESTHESIA 1ST 15 MINUTES: Performed by: INTERNAL MEDICINE

## 2024-12-30 PROCEDURE — 88342 IMHCHEM/IMCYTCHM 1ST ANTB: CPT | Performed by: PATHOLOGY

## 2024-12-30 PROCEDURE — 27201012 HC FORCEPS, HOT/COLD, DISP: Performed by: INTERNAL MEDICINE

## 2024-12-30 PROCEDURE — 63600175 PHARM REV CODE 636 W HCPCS: Performed by: NURSE ANESTHETIST, CERTIFIED REGISTERED

## 2024-12-30 PROCEDURE — 27201014 HC GRASPER DEVICE: Performed by: INTERNAL MEDICINE

## 2024-12-30 RX ORDER — SODIUM CHLORIDE 9 MG/ML
INJECTION, SOLUTION INTRAVENOUS CONTINUOUS
Status: DISCONTINUED | OUTPATIENT
Start: 2024-12-30 | End: 2024-12-30 | Stop reason: HOSPADM

## 2024-12-30 RX ORDER — PANTOPRAZOLE SODIUM 40 MG/10ML
40 INJECTION, POWDER, LYOPHILIZED, FOR SOLUTION INTRAVENOUS ONCE
Status: DISCONTINUED | OUTPATIENT
Start: 2024-12-30 | End: 2024-12-30 | Stop reason: HOSPADM

## 2024-12-30 RX ORDER — DIPHENHYDRAMINE HYDROCHLORIDE 50 MG/ML
25 INJECTION INTRAMUSCULAR; INTRAVENOUS EVERY 6 HOURS PRN
Status: DISCONTINUED | OUTPATIENT
Start: 2024-12-30 | End: 2024-12-30 | Stop reason: HOSPADM

## 2024-12-30 RX ORDER — LIDOCAINE HYDROCHLORIDE 20 MG/ML
JELLY TOPICAL ONCE
Status: COMPLETED | OUTPATIENT
Start: 2024-12-30 | End: 2024-12-30

## 2024-12-30 RX ORDER — ONDANSETRON HYDROCHLORIDE 2 MG/ML
4 INJECTION, SOLUTION INTRAVENOUS ONCE AS NEEDED
Status: DISCONTINUED | OUTPATIENT
Start: 2024-12-30 | End: 2024-12-30 | Stop reason: HOSPADM

## 2024-12-30 RX ORDER — HYDROMORPHONE HYDROCHLORIDE 1 MG/ML
0.2 INJECTION, SOLUTION INTRAMUSCULAR; INTRAVENOUS; SUBCUTANEOUS EVERY 5 MIN PRN
Status: DISCONTINUED | OUTPATIENT
Start: 2024-12-30 | End: 2024-12-30 | Stop reason: HOSPADM

## 2024-12-30 RX ORDER — PROPOFOL 10 MG/ML
VIAL (ML) INTRAVENOUS
Status: DISCONTINUED | OUTPATIENT
Start: 2024-12-30 | End: 2024-12-30

## 2024-12-30 RX ORDER — FENTANYL CITRATE 50 UG/ML
25 INJECTION, SOLUTION INTRAMUSCULAR; INTRAVENOUS EVERY 5 MIN PRN
Status: DISCONTINUED | OUTPATIENT
Start: 2024-12-30 | End: 2024-12-30 | Stop reason: HOSPADM

## 2024-12-30 RX ORDER — PROCHLORPERAZINE EDISYLATE 5 MG/ML
5 INJECTION INTRAMUSCULAR; INTRAVENOUS EVERY 30 MIN PRN
Status: DISCONTINUED | OUTPATIENT
Start: 2024-12-30 | End: 2024-12-30 | Stop reason: HOSPADM

## 2024-12-30 RX ORDER — PROPOFOL 10 MG/ML
INJECTION, EMULSION INTRAVENOUS CONTINUOUS PRN
Status: DISCONTINUED | OUTPATIENT
Start: 2024-12-30 | End: 2024-12-30

## 2024-12-30 RX ADMIN — PROPOFOL 50 MG: 10 INJECTION, EMULSION INTRAVENOUS at 10:12

## 2024-12-30 RX ADMIN — PROPOFOL 30 MG: 10 INJECTION, EMULSION INTRAVENOUS at 10:12

## 2024-12-30 RX ADMIN — PROPOFOL 200 MCG/KG/MIN: 10 INJECTION, EMULSION INTRAVENOUS at 10:12

## 2024-12-30 RX ADMIN — PROPOFOL 150 MG: 10 INJECTION, EMULSION INTRAVENOUS at 10:12

## 2024-12-30 RX ADMIN — LIDOCAINE HYDROCHLORIDE 3 ML: 20 JELLY TOPICAL at 10:12

## 2024-12-30 NOTE — TRANSFER OF CARE
"Anesthesia Transfer of Care Note    Patient: Sincere Way    Procedure(s) Performed: Procedure(s) (LRB):  EGD (ESOPHAGOGASTRODUODENOSCOPY) (N/A)  MANOMETRY, ESOPHAGUS, WITH IMPEDANCE MEASUREMENT (N/A)    Patient location: [facility]    Anesthesia Type: general    Transport from OR: Transported from OR on room air with adequate spontaneous ventilation    Post pain: adequate analgesia    Post assessment: no apparent anesthetic complications    Post vital signs: stable    Level of consciousness: responds to stimulation and sedated    Nausea/Vomiting: no nausea/vomiting    Complications: none    Transfer of care protocol was followed      Last vitals: Visit Vitals  /74 (BP Location: Left arm, Patient Position: Lying)   Pulse 94   Temp 37 °C (98.6 °F) (Temporal)   Resp 20   Ht 6' 5" (1.956 m)   Wt 99.8 kg (220 lb)   SpO2 96%   BMI 26.09 kg/m²     "

## 2024-12-30 NOTE — PLAN OF CARE
Discharge instructions reviewed with pt and pt's parents at bedside. Verbalized understanding. Packet given.

## 2024-12-30 NOTE — ANESTHESIA POSTPROCEDURE EVALUATION
Anesthesia Post Evaluation    Patient: Sincere Way    Procedure(s) Performed: Procedure(s) (LRB):  EGD (ESOPHAGOGASTRODUODENOSCOPY) (N/A)  MANOMETRY, ESOPHAGUS, WITH IMPEDANCE MEASUREMENT (N/A)    Final Anesthesia Type: general      Patient participation: Yes- Able to Participate  Level of consciousness: awake and alert  Post-procedure vital signs: reviewed and stable  Pain control: Pain has been treated.  Airway patency: patent    PONV status: Absent or treated.  Anesthetic complications: no      Cardiovascular status: hemodynamically stable  Respiratory status: unassisted  Hydration status: euvolemic  Follow-up not needed.              Vitals Value Taken Time   /63 12/30/24 1132   Temp 37 °C (98.6 °F) 12/30/24 1106   Pulse 84 12/30/24 1144   Resp 26 12/30/24 1144   SpO2 99 % 12/30/24 1144   Vitals shown include unfiled device data.      No case tracking events are documented in the log.      Pain/Kristin Score: Kristin Score: 10 (12/30/2024 11:30 AM)

## 2024-12-30 NOTE — OR NURSING
Manometry catheter placed in R nare with endoscopic guidance. Pt completed all esophageal manometry protocol, tolerated well.

## 2024-12-30 NOTE — PROVATION PATIENT INSTRUCTIONS
Discharge Summary/Instructions after an Endoscopic Procedure  Patient Name: Sincere Way  Patient MRN: [id number]  Patient YOB: 2000 Monday, December 30, 2024  Marita Gutierrez MD  Dear patient,  As a result of recent federal legislation (The Federal Cures Act), you may   receive lab or pathology results from your procedure in your [facility]   account before your physician is able to contact you. Your physician or   their representative will relay the results to you with their   recommendations at their soonest availability.  Thank you,  RESTRICTIONS:  During your procedure today, you received medications for sedation.  These   medications may affect your judgment, balance and coordination.  Therefore,   for 24 hours, you have the following restrictions:   - DO NOT drive a car, operate machinery, make legal/financial decisions,   sign important papers or drink alcohol.    ACTIVITY:  Today: no heavy lifting, straining or running due to procedural   sedation/anesthesia.  The following day: return to full activity including work.  DIET:  Eat and drink normally unless instructed otherwise.     TREATMENT FOR COMMON SIDE EFFECTS:  - Mild abdominal pain, nausea, belching, bloating or excessive gas:  rest,   eat lightly and use a heating pad.  - Sore Throat: treat with throat lozenges and/or gargle with warm salt   water.  - Because air was used during the procedure, expelling large amounts of air   from your rectum or belching is normal.  - If a bowel prep was taken, you may not have a bowel movement for 1-3 days.    This is normal.  SYMPTOMS TO WATCH FOR AND REPORT TO YOUR PHYSICIAN:  1. Abdominal pain or bloating, other than gas cramps.  2. Chest pain.  3. Back pain.  4. Signs of infection such as: chills or fever occurring within 24 hours   after the procedure.  5. Rectal bleeding, which would show as bright red, maroon, or black stools.   (A tablespoon of blood from the rectum is not serious, especially  if   hemorrhoids are present.)  6. Vomiting.  7. Weakness or dizziness.  GO DIRECTLY TO THE NEAREST EMERGENCY ROOM IF YOU HAVE ANY OF THE FOLLOWING:      Difficulty breathing              Chills and/or fever over 101 F   Persistent vomiting and/or vomiting blood   Severe abdominal pain   Severe chest pain   Black, tarry stools   Bleeding- more than one tablespoon   Any other symptom or condition that you feel may need urgent attention  Your doctor recommends these additional instructions:  If any biopsies were taken, your doctors clinic will contact you in 1 to 2   weeks with any results.  - Discharge patient to home.   - Resume previous diet.   - Continue present medications.   - Await pathology results.  For questions, problems or results please call your physician - Marita Gutierrez MD at Work:  [phone].  [facility], EMERGENCY ROOM PHONE NUMBER: [phone]  IF A COMPLICATION OR EMERGENCY SITUATION ARISES AND YOU ARE UNABLE TO REACH   YOUR PHYSICIAN - GO DIRECTLY TO THE EMERGENCY ROOM.  Marita Gutierrez MD  12/30/2024 11:20:47 AM  This report has been verified and signed electronically.  Dear patient,  As a result of recent federal legislation (The Federal Cures Act), you may   receive lab or pathology results from your procedure in your [facility]   account before your physician is able to contact you. Your physician or   their representative will relay the results to you with their   recommendations at their soonest availability.  Thank you,  PROVATION

## 2024-12-30 NOTE — H&P
Short Stay Endoscopy History and Physical    PCP - Amanda Richardson MD     Procedure - EGD, dilation, endoflip, manometry  ASA - per anesthesia  Mallampati - per anesthesia  History of Anesthesia problems - no  Family history Anesthesia problems -  no   Plan of anesthesia - General    HPI:  This is a 24 y.o. male here for evaluation of : Dysphagia, achalasia          ROS:  Constitutional: No fevers, chills, No weight loss  CV: No chest pain  Pulm: No cough, No shortness of breath  Ophtho: No vision changes  GI: see HPI  Derm: No rash    Medical History:  has a past medical history of Anxiety disorder, unspecified, COVID-19 (11/2020), Dysphagia, and GERD (gastroesophageal reflux disease).    Surgical History:  has a past surgical history that includes Appendectomy; [place] tooth extraction; Esophageal manometry with measurement of impedance (N/A, 4/29/2022); Esophagogastroduodenoscopy (N/A, 5/23/2022); myotomy, peroral, endoscopic (N/A, 8/25/2022); Esophagogastroduodenoscopy (N/A, 2/28/2023); and Esophagogastroduodenoscopy (N/A, 9/19/2024).    Family History: family history includes Breast cancer in his paternal grandmother; Colon polyps in his paternal grandfather; Diabetes in his father and paternal grandfather; Hiatal hernia in his maternal grandmother; Hyperlipidemia in his father; Hypertension in his mother; Lung cancer in his maternal grandfather; Pancreatic cancer in his paternal uncle; Pancreatitis in his maternal grandmother; Parkinsonism in his paternal grandfather.. Otherwise no colon cancer, inflammatory bowel disease, or GI malignancies.    Social History:  reports that he has never smoked. He has never used smokeless tobacco. He reports that he does not drink alcohol and does not use drugs.    Review of patient's allergies indicates:  No Known Allergies    Medications:   Medications Prior to Admission   Medication Sig Dispense Refill Last Dose/Taking    citalopram (CELEXA) 10 MG tablet Take  10 mg by mouth once daily.       esomeprazole (NEXIUM) 40 MG capsule Take 1 capsule (40 mg total) by mouth once daily. 90 capsule 3     famotidine (PEPCID) 40 MG tablet Take 40 mg by mouth 2 (two) times daily.          Physical Exam:    Vital Signs: There were no vitals filed for this visit.    Gen: NAD, lying comfortably  HENT: NCAT, oropharynx clear  Eyes: anicteric sclerae, EOMI grossly  Neck: supple, no visible masses/goiter  Cardiac: RRR  Lungs: non-labored breathing  Abd: soft, NT/ND, normoactive BS  Ext: no LE edema, warm, well perfused  Skin: skin intact on exposed body parts, no visible rashes, lesions  Neuro: A&Ox4, neuro exam grossly intact, moves all extremities  Psych: appropriate mood, affect      Labs:  Lab Results   Component Value Date    WBC 8.36 08/26/2022    HGB 14.1 08/26/2022    HCT 40.2 08/26/2022     08/26/2022    ALT 8 (L) 02/26/2014    AST 12 02/26/2014     08/26/2022    K 4.5 08/26/2022     08/26/2022    CREATININE 0.9 08/26/2022    BUN 7 08/26/2022    CO2 25 08/26/2022       Plan:  EGD with Endoflip, esophageal dilation, esophageal manometry catheter placement, followed by esophageal manometry with dysphagia protocol for dysphagia, history of achalasia    I have explained the risks and benefits of endoscopy procedures to the patient including but not limited to bleeding, perforation, infection, and death.  The patient was asked if they understand and allowed to ask any further questions to their satisfaction.      Marita Gutierrez MD

## 2024-12-31 NOTE — PROVATION PATIENT INSTRUCTIONS
Discharge Summary/Instructions after an Endoscopic Procedure  Patient Name: Sincere Way  Patient MRN: [id number]  Patient YOB: 2000 Tuesday, December 31, 2024  Marita Gutierrez MD  Dear patient,  As a result of recent federal legislation (The Federal Cures Act), you may   receive lab or pathology results from your procedure in your [facility]   account before your physician is able to contact you. Your physician or   their representative will relay the results to you with their   recommendations at their soonest availability.  Thank you,  RESTRICTIONS:  During your procedure today, you received medications for sedation.  These   medications may affect your judgment, balance and coordination.  Therefore,   for 24 hours, you have the following restrictions:   - DO NOT drive a car, operate machinery, make legal/financial decisions,   sign important papers or drink alcohol.    ACTIVITY:  Today: no heavy lifting, straining or running due to procedural   sedation/anesthesia.  The following day: return to full activity including work.  DIET:  Eat and drink normally unless instructed otherwise.     TREATMENT FOR COMMON SIDE EFFECTS:  - Mild abdominal pain, nausea, belching, bloating or excessive gas:  rest,   eat lightly and use a heating pad.  - Sore Throat: treat with throat lozenges and/or gargle with warm salt   water.  - Because air was used during the procedure, expelling large amounts of air   from your rectum or belching is normal.  - If a bowel prep was taken, you may not have a bowel movement for 1-3 days.    This is normal.  SYMPTOMS TO WATCH FOR AND REPORT TO YOUR PHYSICIAN:  1. Abdominal pain or bloating, other than gas cramps.  2. Chest pain.  3. Back pain.  4. Signs of infection such as: chills or fever occurring within 24 hours   after the procedure.  5. Rectal bleeding, which would show as bright red, maroon, or black stools.   (A tablespoon of blood from the rectum is not serious, especially  if   hemorrhoids are present.)  6. Vomiting.  7. Weakness or dizziness.  GO DIRECTLY TO THE NEAREST EMERGENCY ROOM IF YOU HAVE ANY OF THE FOLLOWING:      Difficulty breathing              Chills and/or fever over 101 F   Persistent vomiting and/or vomiting blood   Severe abdominal pain   Severe chest pain   Black, tarry stools   Bleeding- more than one tablespoon   Any other symptom or condition that you feel may need urgent attention  Your doctor recommends these additional instructions:  If any biopsies were taken, your doctors clinic will contact you in 1 to 2   weeks with any results.  Results to be shared with the patient.  Follow up with your referring provider.  For questions, problems or results please call your physician - Marita Gutierrez MD at Work:  [phone].  [facility], EMERGENCY ROOM PHONE NUMBER: [phone]  IF A COMPLICATION OR EMERGENCY SITUATION ARISES AND YOU ARE UNABLE TO REACH   YOUR PHYSICIAN - GO DIRECTLY TO THE EMERGENCY ROOM.  Marita Gutierrez MD  12/31/2024 12:36:23 PM  This report has been verified and signed electronically.  Dear patient,  As a result of recent federal legislation (The Federal Cures Act), you may   receive lab or pathology results from your procedure in your [facility]   account before your physician is able to contact you. Your physician or   their representative will relay the results to you with their   recommendations at their soonest availability.  Thank you,  PROVATION

## 2025-01-03 ENCOUNTER — TELEPHONE (OUTPATIENT)
Dept: GASTROENTEROLOGY | Facility: CLINIC | Age: 25
End: 2025-01-03
Payer: COMMERCIAL

## 2025-01-03 DIAGNOSIS — B37.81 CANDIDA ESOPHAGITIS: Primary | ICD-10-CM

## 2025-01-03 LAB
FINAL PATHOLOGIC DIAGNOSIS: NORMAL
GROSS: NORMAL
Lab: NORMAL

## 2025-01-03 RX ORDER — FLUCONAZOLE 200 MG/1
TABLET ORAL
Qty: 15 TABLET | Refills: 0 | Status: SHIPPED | OUTPATIENT
Start: 2025-01-03

## 2025-01-03 NOTE — TELEPHONE ENCOUNTER
[facility] Note    Candida esophagitis noted on biopsies from the patient's recent EGD.  Start Fluconazole for 14 days.    Marita Gutierrez MD

## 2025-02-19 ENCOUNTER — OFFICE VISIT (OUTPATIENT)
Dept: SURGERY | Facility: CLINIC | Age: 25
End: 2025-02-19
Payer: COMMERCIAL

## 2025-02-19 VITALS
SYSTOLIC BLOOD PRESSURE: 137 MMHG | HEART RATE: 71 BPM | BODY MASS INDEX: 27.51 KG/M2 | WEIGHT: 233 LBS | HEIGHT: 77 IN | DIASTOLIC BLOOD PRESSURE: 77 MMHG

## 2025-02-19 DIAGNOSIS — K22.0 ACHALASIA: Primary | ICD-10-CM

## 2025-02-19 RX ORDER — CITALOPRAM 10 MG/1
10 TABLET ORAL
COMMUNITY
Start: 2025-01-09

## 2025-02-19 NOTE — PROGRESS NOTES
[facility] Clinic H&P    Subjective:     Sincere Way is a 24 y.o. male with PMH GERD, anxiety, dysphagia who is s/p POEM on 8/25/22 presents to clinic for continued reflux symptoms.     He is still able to keep food down, gaining weight since surgery (65lbs), some heart burn still but not worse since prior to surgery. His only complaint is feeling like food is getting stuck in his throat and has to continually drink water after eating to relieve symptoms. 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 Usually has to lay down, chest pain, dry heaving, sweating, palpitations. Denies nausea vomiting, SOB, palpitations.     Interval history 2/19/25:  Patient returns to clinic with continued dysphagia. He is s/p esophageal manometry with an impression of achalasia and trouble swallowing. EGD also with dilated esophagus as well as candidiasis. He has since completed a course of antifungals. His symptoms at the moment are dysphagia of solids which is manageable if he chases it with water, heartburn at night managed with a wedge pillow, constant globus and occasional chest pain. He has maintained his weight. He denies a change in his bowel habits. He does note that he had a vasovagal episode recently and presented to the hospital with a negative ACS workup.     GERD Questionnaire  Meds: Pantoprazole 40mg daily and TUMS  + Typical heartburn  + Regurgitation  + Dysphagia solids  - Dysphagia liquids  - Hoarseness  - Sore throat  - Cough  - Asthma  + Chest pain  - Water brash  + Globus  - Nausea  - Vomiting     only if has dysphagia  Eckardt Score (none =0, occ=1, daily=2, every meal=3, weight: 0=0, <5=1, 5-10=2, >10=3)  Dysphagia = 3  Regurgitation = 1  Chest pain = 1  Weight loss (kg) = 0 lb (none dieting)   Total = 5    PMH:   Past Medical History:   Diagnosis Date    Anxiety disorder, unspecified     COVID-19 11/2020    Dysphagia     GERD (gastroesophageal reflux  disease)        Past Surgical History:   Past Surgical History:   Procedure Laterality Date    APPENDECTOMY      ESOPHAGEAL MANOMETRY WITH MEASUREMENT OF IMPEDANCE N/A 4/29/2022    Procedure: MANOMETRY-ESOPHAGEAL-WITH IMPEDANCE;  Surgeon: Winter Solis MD;  Location: [facility] (4TH FLR);  Service: Endoscopy;  Laterality: N/A;  r/o rumination  fully vaccinated, instructions sent to [facility] and emailed to [email or web address]    ESOPHAGEAL MANOMETRY WITH MEASUREMENT OF IMPEDANCE N/A 12/30/2024    Procedure: MANOMETRY, ESOPHAGUS, WITH IMPEDANCE MEASUREMENT;  Surgeon: Marita Gutierrez MD;  Location: [facility] (2ND FLR);  Service: Endoscopy;  Laterality: N/A;    ESOPHAGOGASTRODUODENOSCOPY N/A 5/23/2022    Procedure: ESOPHAGOGASTRODUODENOSCOPY (EGD);  Surgeon: Winter Solis MD;  Location: [facility] (2ND FLR);  Service: Endoscopy;  Laterality: N/A;  3 days full liquids 1 day clears-cardiac clearance see te 5/4/22-vacc-inst email-tb-ok to use this spot per prevot-    ESOPHAGOGASTRODUODENOSCOPY N/A 2/28/2023    Procedure: EGD (ESOPHAGOGASTRODUODENOSCOPY);  Surgeon: Winter Solis MD;  Location: [facility] (2ND FLR);  Service: Endoscopy;  Laterality: N/A;  Endoflip  2nd floor-End stage achalasia  propofol only  full liquid diet x2 days, clear liquid diet x1 day prior to procedure  [facility]  [place], no answer- [place]  2/20 - pt didnt follow prep diet instructions r/s from 2/22 to 2/    ESOPHAGOGASTRODUODENOSCOPY N/A 9/19/2024    Procedure: EGD (ESOPHAGOGASTRODUODENOSCOPY);  Surgeon: Mendez Pozo MD;  Location: [facility] (2ND FLR);  Service: Endoscopy;  Laterality: N/A;  referral dr porter/ no longer has achlasia had poem 22/prep inst sent to pt via portal  2nd floor- previous scopes done on 2nd floor)    ESOPHAGOGASTRODUODENOSCOPY N/A 12/30/2024    Procedure: EGD (ESOPHAGOGASTRODUODENOSCOPY);  Surgeon: Marita Gutierrez MD;  Location: [facility] ([address] [place] [address]);  Service: Endoscopy;   Laterality: N/A;  EGD with Endoflip, esophageal dilation, esophageal manometry catheter placement, followed by esophageal manometry with dysphagia protocol  spera pt / 2nd-end stage achalasia / 3 days full / 1 day clear/ prep inst sent to pt via portal /propfol only /      MYOTOMY, PERORAL, ENDOSCOPIC N/A 8/25/2022    Procedure: MYOTOMY, PERORAL, ENDOSCOPIC POEM;  Surgeon: Elio Marte Jr., MD;  Location: [facility] OR [address] [place] [address];  Service: General;  Laterality: N/A;    WISDOM TOOTH EXTRACTION         Social History:  Social History     Socioeconomic History    Marital status: Single   Tobacco Use    Smoking status: Never    Smokeless tobacco: Never   Substance and Sexual Activity    Alcohol use: No    Drug use: No     Social Drivers of Health     Financial Resource Strain: Low Risk  (2/19/2025)    Overall Financial Resource Strain (CARDIA)     Difficulty of Paying Living Expenses: Not hard at all   Food Insecurity: No Food Insecurity (2/19/2025)    Hunger Vital Sign     Worried About Running Out of Food in the Last Year: Never true     Ran Out of Food in the Last Year: Never true   Transportation Needs: No Transportation Needs (2/19/2025)    PRAPARE - Transportation     Lack of Transportation (Medical): No     Lack of Transportation (Non-Medical): No   Physical Activity: Insufficiently Active (2/19/2025)    Exercise Vital Sign     Days of Exercise per Week: 4 days     Minutes of Exercise per Session: 30 min   Stress: No Stress Concern Present (2/19/2025)    [employer] [place] [employer] - Occupational Stress Questionnaire     Feeling of Stress : Only a little   Housing Stability: Low Risk  (2/19/2025)    Housing Stability Vital Sign     Unable to Pay for Housing in the Last Year: No     Number of Times Moved in the Last Year: 0     Homeless in the Last Year: No       Allergies: Review of patient's allergies indicates:  No Known Allergies    Medications:  Current Outpatient Medications on File Prior to  Visit   Medication Sig Dispense Refill    esomeprazole (NEXIUM) 40 MG capsule Take 1 capsule (40 mg total) by mouth once daily. 90 capsule 3    famotidine (PEPCID) 40 MG tablet Take 40 mg by mouth 2 (two) times daily.      fluconazole (DIFLUCAN) 200 MG Tab Take two tablets by mouth on day one then take one tablet by mouth once per day for the next thirteen days 15 tablet 0     No current facility-administered medications on file prior to visit.         Objective:     Vital Signs (Most Recent)       Review of Systems   Constitutional:  Negative for chills, diaphoresis, fever, malaise/fatigue and weight loss.   Respiratory:  Negative for cough and shortness of breath.    Cardiovascular:  Negative for chest pain and palpitations.   Gastrointestinal:  Positive for heartburn. Negative for abdominal pain, constipation, diarrhea, nausea and vomiting.   Endo/Heme/Allergies:  Does not bruise/bleed easily.        Physical Exam:  Gen: awake, alert, in no acute distress  HEENT: normocephalic, atraumatic, EOMI, no scleral icterus  CV: regular rate and rhythm  Pulm: equal chest rise bilaterally, normal work of breathing  Abd:  soft, non-tender, no guarding  Ext: WWP, skin warm and dry    Imaging  The following imaging was reviewed:     EGD 9/19/24 - Dilation in the entire esophagus. An endoclip was found in the esophagus. Removal and retrieval was successful. Esophageal mucosal variant consistent with chronic changes from Achalasia. Z-line regular, 45 cm from the incisors. Retained gastric fluid and food residue. Removed via scope suction. Normal stomach. Normal examined duodenum     Gastric Emptying study 10/2/24 - Delayed radionuclide solid gastric emptying study with 4 hour gastric retention of 52 % with significant activity in the esophagus due to reflux     New Studies     EGD 12/30/24     - Dilation in the upper third of the esophagus, in                          the middle third of the esophagus and in the lower                           third of the esophagus.                          - Esophageal plaques were found, suspicious for                          candidiasis. Biopsied.                          - Normal stomach. Biopsied.                          - Duodenal erosions without bleeding.                          - FLIP imaging performed. Negative for EGJOO.                          - Dilation performed at the gastroesophageal                          junction to 20mm.                          - Esophageal manometry catheter placed at the end                          of the procedure.     Esophageal manometry 12/31/25:  -Type I achalasia is a disorder of EGJ Outflow                          according to the [place] Classification of                          Esophageal Motility Disorders version 4.0.                          -Type I achalasia is defined as an abnormal median                          IRP (> 15 mmHg in the supine position and > 12                          mmHg in the upright position) and 100% failed                          peristalsis.     Assessment:     Sincere Way is a 24 y.o. male with PMH GERD s/p POEM on 8/25/22 who is here with continued symptoms of dysphagia, GERD, and chest pain following poem. He does note that these are better than prior to the procedure. UGI following procedure with good contrast flow from esophagus to stomach. Recent studies, and symptoms, now consistent with continued achalasia, subtype I. While he is able to manage his disease process with lifestyle modification, I worry that he will have progression of disease without intervention. We discussed with him the options of surveillance vs reoperation with a heller myotomy.     Plan:     - Will discuss patients case at upcoming swallowing conference. Discordant endoflip and manometry findings.   - Patient would like to think about options further and will call back with decision   - Discussed Heller as the main suggestion moving  forward.    Jalil Cast  General Surgery PGY I      I have personally taken the history and examined this patient and agree with the resident's note as stated above.         Elio Marte MD

## 2025-03-03 NOTE — LETTER
July 13, 2022      [facility]  [address], [address]  [place] [address]  Phone: [phone]  Fax: [phone]       Patient: Sincere Way   YOB: 2000  Date of Visit: 07/13/2022    To Whom It May Concern:    Renee Way  was at [facility] on 07/13/2022. He was tested POSITIVE for COVID today. The patient may return to work/school on 07/18/2022 with no restrictions. If you have any questions or concerns, or if I can be of further assistance, please do not hesitate to contact me.    Sincerely,    Sunshine Rivers MA      03-Mar-2025 11:00

## 2025-03-18 ENCOUNTER — DOCUMENTATION ONLY (OUTPATIENT)
Dept: SURGERY | Facility: CLINIC | Age: 25
End: 2025-03-18
Payer: COMMERCIAL

## 2025-03-18 NOTE — PATIENT CARE CONFERENCE
[facility]   [facility] MULTIDISCIPLINARY CONFERENCE  PATIENT REVIEW FORM  ____________________________________________________________    CLINIC #: [id number]    DATE: 03/18/2025    DIAGNOSIS:     [x] Achalsia       [] Gastropariesis           [] Functional Dyspepsia   [] Chronic Diarrhea      [] Dysphagia   [] Pseudoachalasia       [] GERD   [] Hiatal Hernia       [] Dysmotility   [] Dumping Syndrome  [] Cyclic Vomiting   [] IBS       [] Outlet Obstruction    [] Fecal Incontinence    [] Hirschsprung's Disease        PRESENTER:      [] Will    [x] Rosanna   [] Placido        [] Silke  [] Lemuel         PATIENT SUMMARY:   Sincere Way is a 24 y.o. male with PMH GERD, anxiety, dysphagia who is s/p POEM on 8/25/22 presents to clinic for continued reflux symptoms.      He is still able to keep food down, gaining weight since surgery (65lbs), some heart burn still but not worse since prior to surgery. His only complaint is feeling like food is getting stuck in his throat and has to continually drink water after eating to relieve symptoms. 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 Usually has to lay down, chest pain, dry heaving, sweating, palpitations. Denies nausea vomiting, SOB, palpitations.      Interval history 2/19/25:  Patient returns to clinic with continued dysphagia. He is s/p esophageal manometry with an impression of achalasia and trouble swallowing. EGD also with dilated esophagus as well as candidiasis. He has since completed a course of antifungals. His symptoms at the moment are dysphagia of solids which is manageable if he chases it with water, heartburn at night managed with a wedge pillow, constant globus and occasional chest pain. He has maintained his weight. He denies a change in his bowel habits. He does note that he had a vasovagal episode recently and presented to the hospital with a negative ACS workup.     RECOMMENDATIONS:    Team suggests surgical Intervention with Heller Myotomy and loose Brennen  Will discuss with patient.    CONSULT NEEDED:         [] Surgery    [] ENT    [] GI    [] Speech Pathology                IMAGING:       [] Esophagram     [] MBS    [] CT ABD/PELVIS       [] GES   [] MRI    [] UGI w/SBFT   [] SITZ MARKER      [] EKG    [] U/S    [] DEFOGRAM    PROCEDURES:    [] EGD  [] Impedance  [] MANOMETRY  [x] SURGICAL INTERVENTION      [] COLONOSCOPY    [] ERCP    [] EUS    [] ENTEROSCOPY

## 2025-03-25 ENCOUNTER — TELEPHONE (OUTPATIENT)
Dept: SURGERY | Facility: CLINIC | Age: 25
End: 2025-03-25
Payer: COMMERCIAL

## (undated) DEVICE — SYR ONLY LUER LOCK 20CC

## (undated) DEVICE — TOWEL OR DISP STRL BLUE 2/PK

## (undated) DEVICE — DRAPE HALF SURGICAL 40X58IN

## (undated) DEVICE — BANDAID GARFIELD

## (undated) DEVICE — Device

## (undated) DEVICE — KIT ENDOKIT COMPLIANCE CUSTOM

## (undated) DEVICE — BLADE SURG STAINLESS STEEL #11

## (undated) DEVICE — NDL INSUF ULTRA VERESS 120MM